# Patient Record
Sex: MALE | Race: WHITE | NOT HISPANIC OR LATINO | Employment: OTHER | URBAN - METROPOLITAN AREA
[De-identification: names, ages, dates, MRNs, and addresses within clinical notes are randomized per-mention and may not be internally consistent; named-entity substitution may affect disease eponyms.]

---

## 2018-06-09 ENCOUNTER — HOSPITAL ENCOUNTER (OUTPATIENT)
Facility: MEDICAL CENTER | Age: 73
End: 2018-06-11
Attending: EMERGENCY MEDICINE | Admitting: HOSPITALIST
Payer: COMMERCIAL

## 2018-06-09 ENCOUNTER — APPOINTMENT (OUTPATIENT)
Dept: RADIOLOGY | Facility: MEDICAL CENTER | Age: 73
End: 2018-06-09
Attending: EMERGENCY MEDICINE
Payer: COMMERCIAL

## 2018-06-09 DIAGNOSIS — T14.8XXA ABRASION: ICD-10-CM

## 2018-06-09 DIAGNOSIS — S52.531A CLOSED COLLES' FRACTURE OF RIGHT RADIUS, INITIAL ENCOUNTER: ICD-10-CM

## 2018-06-09 DIAGNOSIS — S52.611A CLOSED DISPLACED FRACTURE OF STYLOID PROCESS OF RIGHT ULNA, INITIAL ENCOUNTER: ICD-10-CM
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
## 2018-06-09 DIAGNOSIS — S52.131A DISPLACED FRACTURE OF NECK OF RIGHT RADIUS, INITIAL ENCOUNTER FOR CLOSED FRACTURE: ICD-10-CM

## 2018-06-09 LAB
ANION GAP SERPL CALC-SCNC: 10 MMOL/L (ref 0–11.9)
APTT PPP: 24.7 SEC (ref 24.7–36)
BASOPHILS # BLD AUTO: 0.8 % (ref 0–1.8)
BASOPHILS # BLD: 0.1 K/UL (ref 0–0.12)
BUN SERPL-MCNC: 13 MG/DL (ref 8–22)
CALCIUM SERPL-MCNC: 9 MG/DL (ref 8.4–10.2)
CHLORIDE SERPL-SCNC: 105 MMOL/L (ref 96–112)
CO2 SERPL-SCNC: 22 MMOL/L (ref 20–33)
CREAT SERPL-MCNC: 0.94 MG/DL (ref 0.5–1.4)
EOSINOPHIL # BLD AUTO: 0.44 K/UL (ref 0–0.51)
EOSINOPHIL NFR BLD: 3.5 % (ref 0–6.9)
ERYTHROCYTE [DISTWIDTH] IN BLOOD BY AUTOMATED COUNT: 43.4 FL (ref 35.9–50)
GLUCOSE SERPL-MCNC: 107 MG/DL (ref 65–99)
HCT VFR BLD AUTO: 45.3 % (ref 42–52)
HGB BLD-MCNC: 15.4 G/DL (ref 14–18)
IMM GRANULOCYTES # BLD AUTO: 0.05 K/UL (ref 0–0.11)
IMM GRANULOCYTES NFR BLD AUTO: 0.4 % (ref 0–0.9)
INR PPP: 0.98 (ref 0.87–1.13)
LYMPHOCYTES # BLD AUTO: 1.42 K/UL (ref 1–4.8)
LYMPHOCYTES NFR BLD: 11.4 % (ref 22–41)
MCH RBC QN AUTO: 31.1 PG (ref 27–33)
MCHC RBC AUTO-ENTMCNC: 34 G/DL (ref 33.7–35.3)
MCV RBC AUTO: 91.5 FL (ref 81.4–97.8)
MONOCYTES # BLD AUTO: 1.02 K/UL (ref 0–0.85)
MONOCYTES NFR BLD AUTO: 8.2 % (ref 0–13.4)
NEUTROPHILS # BLD AUTO: 9.41 K/UL (ref 1.82–7.42)
NEUTROPHILS NFR BLD: 75.7 % (ref 44–72)
NRBC # BLD AUTO: 0 K/UL
NRBC BLD-RTO: 0 /100 WBC
PLATELET # BLD AUTO: 226 K/UL (ref 164–446)
PMV BLD AUTO: 10.4 FL (ref 9–12.9)
POTASSIUM SERPL-SCNC: 3.9 MMOL/L (ref 3.6–5.5)
PROTHROMBIN TIME: 12.9 SEC (ref 12–14.6)
RBC # BLD AUTO: 4.95 M/UL (ref 4.7–6.1)
SODIUM SERPL-SCNC: 137 MMOL/L (ref 135–145)
WBC # BLD AUTO: 12.4 K/UL (ref 4.8–10.8)

## 2018-06-09 PROCEDURE — G0378 HOSPITAL OBSERVATION PER HR: HCPCS

## 2018-06-09 PROCEDURE — 302874 HCHG BANDAGE ACE 2 OR 3""

## 2018-06-09 PROCEDURE — A9270 NON-COVERED ITEM OR SERVICE: HCPCS | Performed by: HOSPITALIST

## 2018-06-09 PROCEDURE — 96374 THER/PROPH/DIAG INJ IV PUSH: CPT

## 2018-06-09 PROCEDURE — 90471 IMMUNIZATION ADMIN: CPT

## 2018-06-09 PROCEDURE — 700102 HCHG RX REV CODE 250 W/ 637 OVERRIDE(OP): Performed by: HOSPITALIST

## 2018-06-09 PROCEDURE — 80048 BASIC METABOLIC PNL TOTAL CA: CPT

## 2018-06-09 PROCEDURE — 700101 HCHG RX REV CODE 250: Performed by: EMERGENCY MEDICINE

## 2018-06-09 PROCEDURE — 85730 THROMBOPLASTIN TIME PARTIAL: CPT

## 2018-06-09 PROCEDURE — 99285 EMERGENCY DEPT VISIT HI MDM: CPT

## 2018-06-09 PROCEDURE — 99219 PR INITIAL OBSERVATION CARE,LEVL II: CPT | Performed by: HOSPITALIST

## 2018-06-09 PROCEDURE — 85610 PROTHROMBIN TIME: CPT

## 2018-06-09 PROCEDURE — 71045 X-RAY EXAM CHEST 1 VIEW: CPT

## 2018-06-09 PROCEDURE — 90715 TDAP VACCINE 7 YRS/> IM: CPT | Performed by: EMERGENCY MEDICINE

## 2018-06-09 PROCEDURE — 700111 HCHG RX REV CODE 636 W/ 250 OVERRIDE (IP): Performed by: HOSPITALIST

## 2018-06-09 PROCEDURE — 700111 HCHG RX REV CODE 636 W/ 250 OVERRIDE (IP): Performed by: EMERGENCY MEDICINE

## 2018-06-09 PROCEDURE — 96375 TX/PRO/DX INJ NEW DRUG ADDON: CPT

## 2018-06-09 PROCEDURE — 25605 CLTX DST RDL FX/EPHYS SEP W/: CPT

## 2018-06-09 PROCEDURE — 85025 COMPLETE CBC W/AUTO DIFF WBC: CPT

## 2018-06-09 PROCEDURE — 73200 CT UPPER EXTREMITY W/O DYE: CPT | Mod: RT

## 2018-06-09 PROCEDURE — 700105 HCHG RX REV CODE 258: Performed by: HOSPITALIST

## 2018-06-09 PROCEDURE — 36415 COLL VENOUS BLD VENIPUNCTURE: CPT

## 2018-06-09 PROCEDURE — 73110 X-RAY EXAM OF WRIST: CPT | Mod: RT

## 2018-06-09 RX ORDER — POLYETHYLENE GLYCOL 3350 17 G/17G
1 POWDER, FOR SOLUTION ORAL
Status: DISCONTINUED | OUTPATIENT
Start: 2018-06-09 | End: 2018-06-11 | Stop reason: HOSPADM

## 2018-06-09 RX ORDER — BUPIVACAINE HYDROCHLORIDE 5 MG/ML
10 INJECTION, SOLUTION EPIDURAL; INTRACAUDAL ONCE
Status: COMPLETED | OUTPATIENT
Start: 2018-06-09 | End: 2018-06-09

## 2018-06-09 RX ORDER — AMOXICILLIN 250 MG
2 CAPSULE ORAL 2 TIMES DAILY
Status: DISCONTINUED | OUTPATIENT
Start: 2018-06-09 | End: 2018-06-11 | Stop reason: HOSPADM

## 2018-06-09 RX ORDER — ONDANSETRON 2 MG/ML
4 INJECTION INTRAMUSCULAR; INTRAVENOUS EVERY 4 HOURS PRN
Status: DISCONTINUED | OUTPATIENT
Start: 2018-06-09 | End: 2018-06-11 | Stop reason: HOSPADM

## 2018-06-09 RX ORDER — SODIUM CHLORIDE 9 MG/ML
INJECTION, SOLUTION INTRAVENOUS CONTINUOUS
Status: DISCONTINUED | OUTPATIENT
Start: 2018-06-09 | End: 2018-06-10

## 2018-06-09 RX ORDER — BISACODYL 10 MG
10 SUPPOSITORY, RECTAL RECTAL
Status: DISCONTINUED | OUTPATIENT
Start: 2018-06-09 | End: 2018-06-11 | Stop reason: HOSPADM

## 2018-06-09 RX ORDER — MORPHINE SULFATE 4 MG/ML
2 INJECTION, SOLUTION INTRAMUSCULAR; INTRAVENOUS EVERY 4 HOURS PRN
Status: DISCONTINUED | OUTPATIENT
Start: 2018-06-09 | End: 2018-06-11 | Stop reason: HOSPADM

## 2018-06-09 RX ORDER — ONDANSETRON 4 MG/1
4 TABLET, ORALLY DISINTEGRATING ORAL EVERY 4 HOURS PRN
Status: DISCONTINUED | OUTPATIENT
Start: 2018-06-09 | End: 2018-06-11 | Stop reason: HOSPADM

## 2018-06-09 RX ADMIN — CLOSTRIDIUM TETANI TOXOID ANTIGEN (FORMALDEHYDE INACTIVATED), CORYNEBACTERIUM DIPHTHERIAE TOXOID ANTIGEN (FORMALDEHYDE INACTIVATED), BORDETELLA PERTUSSIS TOXOID ANTIGEN (GLUTARALDEHYDE INACTIVATED), BORDETELLA PERTUSSIS FILAMENTOUS HEMAGGLUTININ ANTIGEN (FORMALDEHYDE INACTIVATED), BORDETELLA PERTUSSIS PERTACTIN ANTIGEN, AND BORDETELLA PERTUSSIS FIMBRIAE 2/3 ANTIGEN 0.5 ML: 5; 2; 2.5; 5; 3; 5 INJECTION, SUSPENSION INTRAMUSCULAR at 18:42

## 2018-06-09 RX ADMIN — BUPIVACAINE HYDROCHLORIDE 10 ML: 5 INJECTION, SOLUTION EPIDURAL; INTRACAUDAL; PERINEURAL at 18:30

## 2018-06-09 RX ADMIN — MORPHINE SULFATE 2 MG: 4 INJECTION INTRAVENOUS at 22:31

## 2018-06-09 RX ADMIN — HYDROMORPHONE HYDROCHLORIDE 0.5 MG: 1 INJECTION, SOLUTION INTRAMUSCULAR; INTRAVENOUS; SUBCUTANEOUS at 20:30

## 2018-06-09 RX ADMIN — SODIUM CHLORIDE 1000 ML: 9 INJECTION, SOLUTION INTRAVENOUS at 20:27

## 2018-06-09 RX ADMIN — DOCUSATE SODIUM AND SENNOSIDES 2 TABLET: 8.6; 5 TABLET, FILM COATED ORAL at 22:30

## 2018-06-09 ASSESSMENT — ENCOUNTER SYMPTOMS
NEUROLOGICAL NEGATIVE: 1
NAUSEA: 0
ABDOMINAL PAIN: 0
PALPITATIONS: 0
HEMOPTYSIS: 0
COUGH: 0
HALLUCINATIONS: 0
HEARTBURN: 0
DIZZINESS: 0
DEPRESSION: 0
MYALGIAS: 1
SPUTUM PRODUCTION: 0
CHILLS: 0
FEVER: 0
HEADACHES: 0
TINGLING: 0

## 2018-06-09 ASSESSMENT — PAIN SCALES - GENERAL
PAINLEVEL_OUTOF10: 4
PAINLEVEL_OUTOF10: 5
PAINLEVEL_OUTOF10: 3
PAINLEVEL_OUTOF10: 4

## 2018-06-09 ASSESSMENT — LIFESTYLE VARIABLES
SUBSTANCE_ABUSE: 0
EVER_SMOKED: YES

## 2018-06-09 NOTE — ED NOTES
"Pt report falling in his \"steep driveway.\"  He C/O right wrist pain/swelling, with multiple small facial abrasions with left knee and left elbow pain as well.  He refuses any additional X-rays.   "

## 2018-06-10 ENCOUNTER — APPOINTMENT (OUTPATIENT)
Dept: RADIOLOGY | Facility: MEDICAL CENTER | Age: 73
End: 2018-06-10
Attending: SURGERY
Payer: COMMERCIAL

## 2018-06-10 PROBLEM — D72.829 LEUKOCYTOSIS: Status: ACTIVE | Noted: 2018-06-10

## 2018-06-10 LAB
ANION GAP SERPL CALC-SCNC: 9 MMOL/L (ref 0–11.9)
BASOPHILS # BLD AUTO: 0.6 % (ref 0–1.8)
BASOPHILS # BLD: 0.06 K/UL (ref 0–0.12)
BUN SERPL-MCNC: 11 MG/DL (ref 8–22)
CALCIUM SERPL-MCNC: 9 MG/DL (ref 8.4–10.2)
CHLORIDE SERPL-SCNC: 107 MMOL/L (ref 96–112)
CO2 SERPL-SCNC: 23 MMOL/L (ref 20–33)
CREAT SERPL-MCNC: 0.86 MG/DL (ref 0.5–1.4)
EOSINOPHIL # BLD AUTO: 0.26 K/UL (ref 0–0.51)
EOSINOPHIL NFR BLD: 2.6 % (ref 0–6.9)
ERYTHROCYTE [DISTWIDTH] IN BLOOD BY AUTOMATED COUNT: 43.5 FL (ref 35.9–50)
GLUCOSE SERPL-MCNC: 104 MG/DL (ref 65–99)
HCT VFR BLD AUTO: 42.7 % (ref 42–52)
HGB BLD-MCNC: 14.3 G/DL (ref 14–18)
IMM GRANULOCYTES # BLD AUTO: 0.02 K/UL (ref 0–0.11)
IMM GRANULOCYTES NFR BLD AUTO: 0.2 % (ref 0–0.9)
LYMPHOCYTES # BLD AUTO: 1.87 K/UL (ref 1–4.8)
LYMPHOCYTES NFR BLD: 19 % (ref 22–41)
MCH RBC QN AUTO: 30.5 PG (ref 27–33)
MCHC RBC AUTO-ENTMCNC: 33.5 G/DL (ref 33.7–35.3)
MCV RBC AUTO: 91 FL (ref 81.4–97.8)
MONOCYTES # BLD AUTO: 1.12 K/UL (ref 0–0.85)
MONOCYTES NFR BLD AUTO: 11.4 % (ref 0–13.4)
NEUTROPHILS # BLD AUTO: 6.52 K/UL (ref 1.82–7.42)
NEUTROPHILS NFR BLD: 66.2 % (ref 44–72)
NRBC # BLD AUTO: 0 K/UL
NRBC BLD-RTO: 0 /100 WBC
PLATELET # BLD AUTO: 215 K/UL (ref 164–446)
PMV BLD AUTO: 11.1 FL (ref 9–12.9)
POTASSIUM SERPL-SCNC: 3.9 MMOL/L (ref 3.6–5.5)
RBC # BLD AUTO: 4.69 M/UL (ref 4.7–6.1)
SODIUM SERPL-SCNC: 139 MMOL/L (ref 135–145)
WBC # BLD AUTO: 9.9 K/UL (ref 4.8–10.8)

## 2018-06-10 PROCEDURE — 700102 HCHG RX REV CODE 250 W/ 637 OVERRIDE(OP): Performed by: INTERNAL MEDICINE

## 2018-06-10 PROCEDURE — 700111 HCHG RX REV CODE 636 W/ 250 OVERRIDE (IP)

## 2018-06-10 PROCEDURE — C1713 ANCHOR/SCREW BN/BN,TIS/BN: HCPCS | Performed by: SURGERY

## 2018-06-10 PROCEDURE — 160048 HCHG OR STATISTICAL LEVEL 1-5: Performed by: SURGERY

## 2018-06-10 PROCEDURE — 500881 HCHG PACK, EXTREMITY: Performed by: SURGERY

## 2018-06-10 PROCEDURE — 700102 HCHG RX REV CODE 250 W/ 637 OVERRIDE(OP)

## 2018-06-10 PROCEDURE — 501445 HCHG STAPLER, SKIN DISP: Performed by: SURGERY

## 2018-06-10 PROCEDURE — 160028 HCHG SURGERY MINUTES - 1ST 30 MINS LEVEL 3: Performed by: SURGERY

## 2018-06-10 PROCEDURE — 700111 HCHG RX REV CODE 636 W/ 250 OVERRIDE (IP): Performed by: HOSPITALIST

## 2018-06-10 PROCEDURE — 502000 HCHG MISC OR IMPLANTS RC 0278: Performed by: SURGERY

## 2018-06-10 PROCEDURE — 36415 COLL VENOUS BLD VENIPUNCTURE: CPT

## 2018-06-10 PROCEDURE — 501838 HCHG SUTURE GENERAL: Performed by: SURGERY

## 2018-06-10 PROCEDURE — A6222 GAUZE <=16 IN NO W/SAL W/O B: HCPCS | Performed by: SURGERY

## 2018-06-10 PROCEDURE — 160035 HCHG PACU - 1ST 60 MINS PHASE I: Performed by: SURGERY

## 2018-06-10 PROCEDURE — 96376 TX/PRO/DX INJ SAME DRUG ADON: CPT

## 2018-06-10 PROCEDURE — 99225 PR SUBSEQUENT OBSERVATION CARE,LEVEL II: CPT | Performed by: INTERNAL MEDICINE

## 2018-06-10 PROCEDURE — A9270 NON-COVERED ITEM OR SERVICE: HCPCS | Performed by: INTERNAL MEDICINE

## 2018-06-10 PROCEDURE — G0378 HOSPITAL OBSERVATION PER HR: HCPCS

## 2018-06-10 PROCEDURE — 80048 BASIC METABOLIC PNL TOTAL CA: CPT

## 2018-06-10 PROCEDURE — 700101 HCHG RX REV CODE 250

## 2018-06-10 PROCEDURE — 700105 HCHG RX REV CODE 258: Performed by: HOSPITALIST

## 2018-06-10 PROCEDURE — 160002 HCHG RECOVERY MINUTES (STAT): Performed by: SURGERY

## 2018-06-10 PROCEDURE — A9270 NON-COVERED ITEM OR SERVICE: HCPCS

## 2018-06-10 PROCEDURE — A6454 SELF-ADHER BAND W>=3" <5"/YD: HCPCS | Performed by: SURGERY

## 2018-06-10 PROCEDURE — 160022 HCHG BLOCK: Performed by: SURGERY

## 2018-06-10 PROCEDURE — 160039 HCHG SURGERY MINUTES - EA ADDL 1 MIN LEVEL 3: Performed by: SURGERY

## 2018-06-10 PROCEDURE — 160009 HCHG ANES TIME/MIN: Performed by: SURGERY

## 2018-06-10 PROCEDURE — 73100 X-RAY EXAM OF WRIST: CPT | Mod: RT

## 2018-06-10 PROCEDURE — 85025 COMPLETE CBC W/AUTO DIFF WBC: CPT

## 2018-06-10 DEVICE — IMPLANTABLE DEVICE: Type: IMPLANTABLE DEVICE | Site: WRIST | Status: FUNCTIONAL

## 2018-06-10 RX ORDER — HYDROMORPHONE HYDROCHLORIDE 2 MG/ML
0.5 INJECTION, SOLUTION INTRAMUSCULAR; INTRAVENOUS; SUBCUTANEOUS EVERY 4 HOURS PRN
Status: DISCONTINUED | OUTPATIENT
Start: 2018-06-10 | End: 2018-06-11 | Stop reason: HOSPADM

## 2018-06-10 RX ORDER — OXYCODONE HYDROCHLORIDE AND ACETAMINOPHEN 5; 325 MG/1; MG/1
TABLET ORAL
Status: COMPLETED
Start: 2018-06-10 | End: 2018-06-10

## 2018-06-10 RX ORDER — OXYCODONE HYDROCHLORIDE 5 MG/1
5 TABLET ORAL EVERY 4 HOURS PRN
Qty: 15 TAB | Refills: 0 | Status: SHIPPED | OUTPATIENT
Start: 2018-06-10 | End: 2018-06-17

## 2018-06-10 RX ORDER — OXYCODONE HYDROCHLORIDE 5 MG/1
5 TABLET ORAL EVERY 4 HOURS PRN
Status: DISCONTINUED | OUTPATIENT
Start: 2018-06-10 | End: 2018-06-11 | Stop reason: HOSPADM

## 2018-06-10 RX ORDER — OXYCODONE HYDROCHLORIDE 10 MG/1
10 TABLET ORAL EVERY 4 HOURS PRN
Status: DISCONTINUED | OUTPATIENT
Start: 2018-06-10 | End: 2018-06-10

## 2018-06-10 RX ORDER — BACITRACIN 50000 [IU]/1
INJECTION, POWDER, FOR SOLUTION INTRAMUSCULAR
Status: DISCONTINUED | OUTPATIENT
Start: 2018-06-10 | End: 2018-06-10 | Stop reason: HOSPADM

## 2018-06-10 RX ADMIN — OXYCODONE HYDROCHLORIDE 5 MG: 5 TABLET ORAL at 19:02

## 2018-06-10 RX ADMIN — MORPHINE SULFATE 2 MG: 4 INJECTION INTRAVENOUS at 02:36

## 2018-06-10 RX ADMIN — FENTANYL CITRATE 25 MCG: 50 INJECTION, SOLUTION INTRAMUSCULAR; INTRAVENOUS at 11:37

## 2018-06-10 RX ADMIN — MORPHINE SULFATE 2 MG: 4 INJECTION INTRAVENOUS at 07:29

## 2018-06-10 RX ADMIN — HYDROMORPHONE HYDROCHLORIDE 0.5 MG: 2 INJECTION, SOLUTION INTRAMUSCULAR; INTRAVENOUS; SUBCUTANEOUS at 09:35

## 2018-06-10 RX ADMIN — FENTANYL CITRATE 25 MCG: 50 INJECTION, SOLUTION INTRAMUSCULAR; INTRAVENOUS at 11:45

## 2018-06-10 RX ADMIN — HYDROMORPHONE HYDROCHLORIDE 0.5 MG: 2 INJECTION, SOLUTION INTRAMUSCULAR; INTRAVENOUS; SUBCUTANEOUS at 05:15

## 2018-06-10 RX ADMIN — OXYCODONE HYDROCHLORIDE AND ACETAMINOPHEN 1 TABLET: 5; 325 TABLET ORAL at 11:40

## 2018-06-10 RX ADMIN — SODIUM CHLORIDE: 9 INJECTION, SOLUTION INTRAVENOUS at 05:11

## 2018-06-10 ASSESSMENT — PAIN SCALES - GENERAL
PAINLEVEL_OUTOF10: 6
PAINLEVEL_OUTOF10: 10
PAINLEVEL_OUTOF10: 10
PAINLEVEL_OUTOF10: 3
PAINLEVEL_OUTOF10: 10
PAINLEVEL_OUTOF10: 5
PAINLEVEL_OUTOF10: 2
PAINLEVEL_OUTOF10: 10
PAINLEVEL_OUTOF10: 9
PAINLEVEL_OUTOF10: 10

## 2018-06-10 ASSESSMENT — ENCOUNTER SYMPTOMS
VOMITING: 0
HALLUCINATIONS: 0
DIZZINESS: 0
PALPITATIONS: 0
DEPRESSION: 0
DIARRHEA: 0
COUGH: 0
SORE THROAT: 0
SHORTNESS OF BREATH: 1
FEVER: 0
NAUSEA: 0
BACK PAIN: 0
CHILLS: 0
MYALGIAS: 1
BLOOD IN STOOL: 0
HEADACHES: 0
FOCAL WEAKNESS: 0
ABDOMINAL PAIN: 0
WEAKNESS: 0
HEARTBURN: 0

## 2018-06-10 ASSESSMENT — LIFESTYLE VARIABLES: ALCOHOL_USE: NO

## 2018-06-10 ASSESSMENT — PATIENT HEALTH QUESTIONNAIRE - PHQ9
1. LITTLE INTEREST OR PLEASURE IN DOING THINGS: SEVERAL DAYS
2. FEELING DOWN, DEPRESSED, IRRITABLE, OR HOPELESS: SEVERAL DAYS
8. MOVING OR SPEAKING SO SLOWLY THAT OTHER PEOPLE COULD HAVE NOTICED. OR THE OPPOSITE, BEING SO FIGETY OR RESTLESS THAT YOU HAVE BEEN MOVING AROUND A LOT MORE THAN USUAL: NOT AT ALL
7. TROUBLE CONCENTRATING ON THINGS, SUCH AS READING THE NEWSPAPER OR WATCHING TELEVISION: NOT AT ALL
SUM OF ALL RESPONSES TO PHQ9 QUESTIONS 1 AND 2: 2
4. FEELING TIRED OR HAVING LITTLE ENERGY: NOT AT ALL
9. THOUGHTS THAT YOU WOULD BE BETTER OFF DEAD, OR OF HURTING YOURSELF: NOT AT ALL
6. FEELING BAD ABOUT YOURSELF - OR THAT YOU ARE A FAILURE OR HAVE LET YOURSELF OR YOUR FAMILY DOWN: NOT AL ALL
5. POOR APPETITE OR OVEREATING: NOT AT ALL

## 2018-06-10 NOTE — ED NOTES
Iv placed , labs drawn and taken to lab. Pt medicated as directed by WINIFRED urban, poc update given to pt. Further orders and dispo pending at this time. No further questions from pt at this time

## 2018-06-10 NOTE — PROGRESS NOTES
Bedside rounds with Dr. Weems. Dr. Weems made aware  OT is not available today. Okay for  pt to go home today, pending discharge orders.

## 2018-06-10 NOTE — PROGRESS NOTES
Pt arrived on [facility], no signs of distress. Pt asleep but wakes to verbal stimulation. CMS intact. POC discussed. Pt complains of 5/10 pain, RUE elevated and iced.

## 2018-06-10 NOTE — ED NOTES
Patient to CT via gurney. Patient to go to inpatient room on return from CT. Belongings bagged for patient.

## 2018-06-10 NOTE — H&P
Hospital Medicine History and Physical    Date of Service  2018    Chief Complaint  Chief Complaint   Patient presents with   • Wrist Pain       History of Presenting Illness  73 y.o. male who presented 2018 with right arm pain. He tripped and fell down a steep driveway. He fell and injured his right arm. Right arm pain, intensity 8/10, constant, no radiation, improved by the narcotics given in ER. Xray shows fractured  Radius that requires surgical repair. Orthopedics has been consulted        Surgery planned tomorrow  Primary Care Physician  Pcp Pt States None    Consultants  Dr casarez    Code Status  full    Review of Systems  Review of Systems   Constitutional: Negative for chills and fever.   HENT: Negative for ear discharge, ear pain, hearing loss and tinnitus.    Respiratory: Negative for cough, hemoptysis and sputum production.    Cardiovascular: Negative for chest pain and palpitations.   Gastrointestinal: Negative for abdominal pain, heartburn and nausea.   Genitourinary: Negative for dysuria and frequency.   Musculoskeletal: Positive for joint pain (right arm) and myalgias.   Neurological: Negative.  Negative for dizziness, tingling and headaches.   Psychiatric/Behavioral: Negative for depression, hallucinations, substance abuse and suicidal ideas.        Past Medical History  none    Surgical History  Right foot surgery    Medications  none    Family History  No family hx of stroke    No family hx of abnormal bleeding    Social History  Social History   Substance Use Topics   • Smoking status: Former Smoker   • Smokeless tobacco: Never Used   • Alcohol use Yes      Comment: Occasionally       Allergies  No Known Allergies     Physical Exam  Laboratory   Hemodynamics  Temp (24hrs), Av.2 °C (99 °F), Min:37.2 °C (99 °F), Max:37.2 °C (99 °F)   Temperature: 37.2 °C (99 °F)  Pulse  Av  Min: 83  Max: 83    Blood Pressure : 152/93      Respiratory      Respiration: 18, Pulse Oximetry: 95  %             Physical Exam   Constitutional: He is oriented to person, place, and time. He appears distressed.   HENT:   Head: Atraumatic.   Abrasion on bridge of nose   Eyes: Right eye exhibits no discharge. Left eye exhibits no discharge. No scleral icterus.   Neck: No JVD present. No tracheal deviation present. No thyromegaly present.   Cardiovascular: Normal rate.  Exam reveals no gallop and no friction rub.    No murmur heard.  Pulmonary/Chest: No respiratory distress. He has no wheezes. He has no rales. He exhibits no tenderness.   Abdominal: He exhibits no distension. There is no tenderness.   Musculoskeletal:   Right arm swelling and tenderness    A brasion left knee   Neurological: He is alert and oriented to person, place, and time. No cranial nerve deficit. Coordination normal.   Skin: No rash noted. He is not diaphoretic. No erythema. No pallor.       Recent Labs      18   1835   WBC  12.4*   RBC  4.95   HEMOGLOBIN  15.4   HEMATOCRIT  45.3   MCV  91.5   MCH  31.1   MCHC  34.0   RDW  43.4   PLATELETCT  226   MPV  10.4     Recent Labs      18   1835   SODIUM  137   POTASSIUM  3.9   CHLORIDE  105   CO2  22   GLUCOSE  107*   BUN  13   CREATININE  0.94   CALCIUM  9.0     Recent Labs      18   1835   GLUCOSE  107*     Recent Labs      18   1835   APTT  24.7   INR  0.98             No results found for: TROPONINI  Urinalysis:  No results found for: SPECGRAVITY, GLUCOSEUR, KETONES, NITRITE, WBCURINE, RBCURINE, BACTERIA, EPITHELCELL     Imaging  Patient Name  Maynor Pringle ([id number]) Sex  Male   1945   Room Bed Code Status Current Location   -ROOM 9 09 FULL 09   Reprint Order Requisition     DX-WRIST-COMPLETE 3+ RIGHT (Order #[id number]) on 18   Last Resulted Time   Sat 2018  5:47 PM   Images     Show images for DX-WRIST-COMPLETE 3+ RIGHT   Imaging Result Status     Status: Final result (Exam End: 2018  5:38 PM)   Imaging Previous Results     Open Hard Copy Result  Report (Order #[id number] - DX-WRIST-COMPLETE 3+ RIGHT)   Narrative       6/9/2018 5:28 PM    HISTORY/REASON FOR EXAM:  Pain/Deformity Following Trauma  Right wrist pain and swelling after fall on steep driveway today    TECHNIQUE/EXAM DESCRIPTION AND NUMBER OF VIEWS:  3 views of the RIGHT wrist.    COMPARISON: None    FINDINGS:    Diffuse osseous demineralization.    Acute comminuted and impacted fracture of the distal radius with volar displacement of the distal fracture fragment and the hand en bloc.    Acute displaced ulnar styloid fracture.    Diffuse soft tissue swelling about the distal forearm.   Impression         Acute comminuted, displaced and impacted fracture of the distal radius.    Acute displaced ulnar styloid fracture.        Assessment/Plan     I anticipate this patient is appropriate for observation status at this time.    * Displaced fracture of neck of right radius, initial encounter for closed fracture- (present on admission)   Assessment & Plan    Npo after midnight    Iv morphine for pain control    Check ekg    Hydrate    Ortho on case            VTE prophylaxis: scd

## 2018-06-10 NOTE — H&P
1121: Pt arrived post ORIF R distal radius, Respirations unlabored/sats approp on NC, Anesthesia report and OR RN hand off, RUE in sling/dressing intact-+cap refill to R fingers/slightly swollen/able to move fingers, Pt did have R supraclavicular nerve block pre operatively, IVF infusing  1130: Pt states pain 6/10-see MAR, Denies nausea/sips of water, Sat approp on NC, RUE elevated on pillows/ice pack in place  1150: Pain starting to improve, No nausea, Weaning oxygen as tolerated  1205: Pain remains tolerable, VSS, Will call report to [facility] RN

## 2018-06-10 NOTE — PROGRESS NOTES
2 RN skin assessment done; skin not WDL.  Abrasion to left elbow, rt knee. Bruising to left thumb and Scabs to nose and left side of face.

## 2018-06-10 NOTE — ED NOTES
Assisted with patient care. Patient tolerated traction with finger splints and 15 lbs traction well. Right wrist splinted by ERP and ER Tech.  Patient brought dinner.

## 2018-06-10 NOTE — PROGRESS NOTES
Pt c/o pain 10/10. Pt states 2mg morphine for pain only relieves his pain for 30mins.  Notified Dr. Baugh if she can give him another pain med to rotate between the morphine.  Dr. Baugh said she will look into it and will place the order.

## 2018-06-10 NOTE — OR SURGEON
Immediate Post OP Note    PreOp Diagnosis: right distal radius fracture    PostOp Diagnosis: same    Procedure(s):  WRIST ORIF - Wound Class: Clean    Surgeon(s):  Ryan Pryor M.D.    Anesthesiologist/Type of Anesthesia:  Anesthesiologist: Rachid Walker M.D./General    Surgical Staff:  Circulator: Liss Harris R.N.  Scrub Person: Jalen Gordillo    Specimens removed if any:  * No specimens in log *    Estimated Blood Loss: minimal    Findings: see dictation    Complications: none noted.     Plan: NWB RUE x 6 weeks; strict elevation; sling for comfort splint to remain on, clean, and dry until clinic followup. Okay to d/c home when pain controlled and cleared by hospitalist; day of surgery okay too. f/u [facility] 10-14 days.         6/10/2018 11:20 AM Ryan Pryor M.D.

## 2018-06-10 NOTE — DISCHARGE SUMMARY
Discharge Summary    CHIEF COMPLAINT ON ADMISSION  Chief Complaint   Patient presents with   • Wrist Pain       Reason for Admission  rt arm/wrist injury     Admission Date  6/9/2018    CODE STATUS  Full Code    HPI & HOSPITAL COURSE  This is a 73 y.o. male w hx of obesity here after a ground-level fall, complete right wrist pain. X-ray of the wrist showed acute comminuted displaced and impacted fracture of the distal radius. There was also an acute displaced ulnar styloid fracture. Orthopedic surgery was consulted and the patient was admitted with plan for surgical repair. His pain was controlled however only modestly with IV pain medications.  He underwent an ORIF of the right wrist is without any consultations. Postoperatively his pain had significantly improved. He had postop instructions to be nonweightbearing on the right upper extremity for 6 weeks and to continue strict elevation. He can wear a sling for comfort and the splint is to remain on clean and dry until follow-up appointment. He used IS post-op and had good activity tolerance after one night of observation.  He will be discharged home with oral pain medications as he was maintaining good saturations and inability independently on room air. He will follow-up with [facility] in 10-14 days.       Therefore, he is discharged in good and stable condition to home with close outpatient follow-up.    The patient recovered much more quickly than anticipated on admission.    Discharge Date  6/11/2018    FOLLOW UP ITEMS POST DISCHARGE  NWB RUE x 6 weeks; strict elevation; sling for comfort   plint to remain on, clean, and dry until clinic followup with [facility] in 10-14 days.      DISCHARGE DIAGNOSES  Principal Problem:    Displaced fracture of neck of right radius, initial encounter for closed fracture POA: Yes  Active Problems:    Leukocytosis POA: Unknown  Resolved Problems:    * No resolved hospital problems. *      FOLLOW UP  Ryan Pryor,  M.D.  [address] [place] [address]  [phone]    Schedule an appointment as soon as possible for a visit in 1 week  Follow up appointment      MEDICATIONS ON DISCHARGE     Medication List      START taking these medications      Instructions   oxyCODONE immediate-release 5 MG Tabs  Commonly known as:  ROXICODONE   Take 1 Tab by mouth every four hours as needed for up to 7 days.  Dose:  5 mg            Allergies  No Known Allergies    DIET  Orders Placed This Encounter   Procedures   • DIET ORDER     Standing Status:   Standing     Number of Occurrences:   1     Order Specific Question:   Diet:     Answer:   Regular [1]       ACTIVITY  As tolerated.  Non-weight bearing RIGHT upper extremity for 6 weeks    CONSULTATIONS  Dr. Pryor - Ortho    PROCEDURES  6/10/2018: ORIF right wrist. No complications    LABORATORY  Lab Results   Component Value Date    SODIUM 139 06/10/2018    POTASSIUM 3.9 06/10/2018    CHLORIDE 107 06/10/2018    CO2 23 06/10/2018    GLUCOSE 104 (H) 06/10/2018    BUN 11 06/10/2018    CREATININE 0.86 06/10/2018        Lab Results   Component Value Date    WBC 9.9 06/10/2018    HEMOGLOBIN 14.3 06/10/2018    HEMATOCRIT 42.7 06/10/2018    PLATELETCT 215 06/10/2018        Total time of the discharge process exceeds 40 minutes.

## 2018-06-10 NOTE — ED PROVIDER NOTES
"CHIEF COMPLAINT  Chief Complaint   Patient presents with   • Wrist Pain       HPI  Maynor Pringle is a 73 y.o. male who presents for moderately severe right dominant wrist pain and dinner fork deformity. Patient was running down a driveway after his dog when he fell onto an outstretched hand. His abrasion to his nose his right knee and his left forearm. Denies loss of consciousness headache nausea vomiting confusion or blood thinner use. Nothing by mouth since 2 PM. No adverse effects anesthesia. No recent upper respiratory infection. No asthma or tobacco use. Patient cannot move the fingers of the right hand secondary to pain but has no dysesthesias or numbness in the hand.    REVIEW OF SYSTEMS  Pertinent positives include: Right wrist pain and deformity, abrasions, fall.  Pertinent negatives include: Neck pain, back pain, chest pain, abdominal pain, hip pain, other extremity injury.  10+ systems reviewed and negative.      PAST MEDICAL HISTORY  Denies      SOCIAL HISTORY  Social History   Substance Use Topics   • Smoking status: Former Smoker   • Smokeless tobacco: Never Used   • Alcohol use Yes      Comment: Occasionally     History   Drug Use No       CURRENT MEDICATIONS  None.    ALLERGIES  No Known Allergies    PHYSICAL EXAM  VITAL SIGNS: /93   Pulse 83   Temp 37.2 °C (99 °F)   Resp 18   Ht 1.676 m (5' 6\") Comment: Statd  Wt 94 kg (207 lb 3.7 oz)   SpO2 95%   BMI 33.45 kg/m²   Reviewed and elevated blood pressure  Constitutional: Well developed, Well nourished, utterly overweight, appears to be in pain.  HENT: Normocephalic, superficial nasal abrasions without deformity or tenderness of the nose or face. bilateral external ears normal, oropharynx moist, No exudates or erythema. No hemotympanum  Eyes: PERRLA, conjunctiva pink, no scleral icterus.   Cardiovascular: Regular S1 and S2 without murmur, rub, gallop. Radial pulse 2+ and capillary refill brisk in the 5 fingers of the right " hand  Respiratory: No rales, rhonchi, wheeze.  Gastrointestinal: Soft, nontender, nondistended.  Skin: Clean superficial abrasions right patella and left proximal ulnar forearm.   Genitourinary:  No costovertebral angle tenderness.   Neurologic: Alert & oriented x 3, cranial nerves 2-12 intact by passive exam.  No focal deficit noted. Finger flexion and extension preserved. Sensation intact to light touch on the pads of the 5 fingers and the 1st dorsal web space of the hand.  Psychiatric: Affect normal, Judgment normal, Mood normal.  Musculoskeletal: Dinner fork deformity right wrist. Severe edema distal quarter of right forearm. Tenderness over the radius and ulna. Mild swelling extends into the hand.    DIFFERENTIAL DIAGNOSIS:  Colles' fracture, intra-articular fracture, ulna fracture, scaphoid fracture, head injury abrasion.    EKG  Pending.    RADIOLOGY/PROCEDURES  CT-WRIST W/O PLUS RECONS RIGHT   Final Result      Radial and probable ulnar fractures as described above.      DX-CHEST-LIMITED (1 VIEW)   Final Result         No acute cardiopulmonary abnormalities are identified.      DX-WRIST-COMPLETE 3+ RIGHT   Final Result         Acute comminuted, displaced and impacted fracture of the distal radius.      Acute displaced ulnar styloid fracture.          LABORATORY:  Results for orders placed or performed during the hospital encounter of 06/09/18   CBC WITH DIFFERENTIAL   Result Value Ref Range    WBC 12.4 (H) 4.8 - 10.8 K/uL    RBC 4.95 4.70 - 6.10 M/uL    Hemoglobin 15.4 14.0 - 18.0 g/dL    Hematocrit 45.3 42.0 - 52.0 %    MCV 91.5 81.4 - 97.8 fL    MCH 31.1 27.0 - 33.0 pg    MCHC 34.0 33.7 - 35.3 g/dL    RDW 43.4 35.9 - 50.0 fL    Platelet Count 226 164 - 446 K/uL    MPV 10.4 9.0 - 12.9 fL    Neutrophils-Polys 75.70 (H) 44.00 - 72.00 %    Lymphocytes 11.40 (L) 22.00 - 41.00 %    Monocytes 8.20 0.00 - 13.40 %    Eosinophils 3.50 0.00 - 6.90 %    Basophils 0.80 0.00 - 1.80 %    Immature Granulocytes 0.40 0.00 -  0.90 %    Nucleated RBC 0.00 /100 WBC    Neutrophils (Absolute) 9.41 (H) 1.82 - 7.42 K/uL    Lymphs (Absolute) 1.42 1.00 - 4.80 K/uL    Monos (Absolute) 1.02 (H) 0.00 - 0.85 K/uL    Eos (Absolute) 0.44 0.00 - 0.51 K/uL    Baso (Absolute) 0.10 0.00 - 0.12 K/uL    Immature Granulocytes (abs) 0.05 0.00 - 0.11 K/uL    NRBC (Absolute) 0.00 K/uL   BASIC METABOLIC PANEL   Result Value Ref Range    Sodium 137 135 - 145 mmol/L    Potassium 3.9 3.6 - 5.5 mmol/L    Chloride 105 96 - 112 mmol/L    Co2 22 20 - 33 mmol/L    Glucose 107 (H) 65 - 99 mg/dL    Bun 13 8 - 22 mg/dL    Creatinine 0.94 0.50 - 1.40 mg/dL    Calcium 9.0 8.4 - 10.2 mg/dL    Anion Gap 10.0 0.0 - 11.9   PROTHROMBIN TIME   Result Value Ref Range    PT 12.9 12.0 - 14.6 sec    INR 0.98 0.87 - 1.13   APTT   Result Value Ref Range    APTT 24.7 24.7 - 36.0 sec       INTERVENTIONS:  Medications   bupivacaine (pf) (MARCAINE/SENSORCAINE) 0.5 % injection 10 mL (not administered)   HYDROmorphone (DILAUDID) injection 0.5 mg (not administered)   tetanus-dipth-acell pertussis (ADACEL) inj 0.5 mL (0.5 mL Intramuscular Given 6/9/18 1842)     Response: Improvement in pain.    COURSE & MEDICAL DECISION MAKING  Case discussed with Dr. Pryor orthopedics who requested admission to the hospitalist for open reduction and internal fixation tomorrow morning at 7 AM.    This discussed with Dr. Carballo hospitalist to admit the patient.    Procedure: Reduction. Patient anesthetized a standard sterile technique with 8 mL of 0.5% bupivacaine in a hematoma block. This resulted in moderate anesthesia. The patient was placed under 15 pounds of traction for 15 minutes. Fracture manipulated by me and splinted in a sugar tong by me. Patient neurovascularly intact after splint placement.    This patient presents with a comminuted displaced intra-articular distal radius fracture and an associated ulnar styloid fracture after a fall. He has a minor closed head injury without evidence of  concussion and skull fracture or intracranial hemorrhage. His other abrasions without evidence of significant torso or extremity injury.    PLAN:  As above    CONDITION: Fair.    FINAL IMPRESSION  1. Closed Colles' fracture of right radius, initial encounter    2. Closed displaced fracture of styloid process of right ulna, initial encounter    3. Fall, initial encounter    4. Abrasion    5.      Partial reduction supportive care of Colles' fracture      Electronically signed by: Mejia Peralta, 6/9/2018 6:26 PM

## 2018-06-10 NOTE — PROGRESS NOTES
Med rec updated and complete  Allergies reviewed  Pt reports no prescription medications, OTC's, or vitamins.  Pt reports no antibiotics in the last 30 days.

## 2018-06-10 NOTE — PROGRESS NOTES
Pt awake, alert and oriented. CMS intact on RUE, splint in place, fingers are warm, cap refill WNL. Pt medicated for 10/10 pain. Plan for surgery today. Pt remains NPO.

## 2018-06-11 VITALS
BODY MASS INDEX: 33.3 KG/M2 | WEIGHT: 207.23 LBS | HEIGHT: 66 IN | DIASTOLIC BLOOD PRESSURE: 75 MMHG | TEMPERATURE: 98.7 F | SYSTOLIC BLOOD PRESSURE: 150 MMHG | OXYGEN SATURATION: 93 % | RESPIRATION RATE: 18 BRPM | HEART RATE: 82 BPM

## 2018-06-11 LAB
ALBUMIN SERPL BCP-MCNC: 3.3 G/DL (ref 3.2–4.9)
BASOPHILS # BLD AUTO: 0.2 % (ref 0–1.8)
BASOPHILS # BLD: 0.03 K/UL (ref 0–0.12)
BUN SERPL-MCNC: 9 MG/DL (ref 8–22)
CALCIUM SERPL-MCNC: 8.9 MG/DL (ref 8.4–10.2)
CHLORIDE SERPL-SCNC: 107 MMOL/L (ref 96–112)
CO2 SERPL-SCNC: 25 MMOL/L (ref 20–33)
CREAT SERPL-MCNC: 0.82 MG/DL (ref 0.5–1.4)
EOSINOPHIL # BLD AUTO: 0.02 K/UL (ref 0–0.51)
EOSINOPHIL NFR BLD: 0.2 % (ref 0–6.9)
ERYTHROCYTE [DISTWIDTH] IN BLOOD BY AUTOMATED COUNT: 44.6 FL (ref 35.9–50)
GLUCOSE SERPL-MCNC: 130 MG/DL (ref 65–99)
HCT VFR BLD AUTO: 40.6 % (ref 42–52)
HGB BLD-MCNC: 13.6 G/DL (ref 14–18)
IMM GRANULOCYTES # BLD AUTO: 0.05 K/UL (ref 0–0.11)
IMM GRANULOCYTES NFR BLD AUTO: 0.4 % (ref 0–0.9)
LYMPHOCYTES # BLD AUTO: 1.77 K/UL (ref 1–4.8)
LYMPHOCYTES NFR BLD: 13.3 % (ref 22–41)
MCH RBC QN AUTO: 31.1 PG (ref 27–33)
MCHC RBC AUTO-ENTMCNC: 33.5 G/DL (ref 33.7–35.3)
MCV RBC AUTO: 92.7 FL (ref 81.4–97.8)
MONOCYTES # BLD AUTO: 1.73 K/UL (ref 0–0.85)
MONOCYTES NFR BLD AUTO: 13 % (ref 0–13.4)
NEUTROPHILS # BLD AUTO: 9.66 K/UL (ref 1.82–7.42)
NEUTROPHILS NFR BLD: 72.9 % (ref 44–72)
NRBC # BLD AUTO: 0 K/UL
NRBC BLD-RTO: 0 /100 WBC
PHOSPHATE SERPL-MCNC: 3.3 MG/DL (ref 2.5–4.5)
PLATELET # BLD AUTO: 207 K/UL (ref 164–446)
PMV BLD AUTO: 10.7 FL (ref 9–12.9)
POTASSIUM SERPL-SCNC: 4.2 MMOL/L (ref 3.6–5.5)
RBC # BLD AUTO: 4.38 M/UL (ref 4.7–6.1)
SODIUM SERPL-SCNC: 138 MMOL/L (ref 135–145)
WBC # BLD AUTO: 13.3 K/UL (ref 4.8–10.8)

## 2018-06-11 PROCEDURE — G8989 SELF CARE D/C STATUS: HCPCS | Mod: CJ

## 2018-06-11 PROCEDURE — 97165 OT EVAL LOW COMPLEX 30 MIN: CPT

## 2018-06-11 PROCEDURE — 700102 HCHG RX REV CODE 250 W/ 637 OVERRIDE(OP): Performed by: INTERNAL MEDICINE

## 2018-06-11 PROCEDURE — A9270 NON-COVERED ITEM OR SERVICE: HCPCS | Performed by: HOSPITALIST

## 2018-06-11 PROCEDURE — 99217 PR OBSERVATION CARE DISCHARGE: CPT | Performed by: INTERNAL MEDICINE

## 2018-06-11 PROCEDURE — A9270 NON-COVERED ITEM OR SERVICE: HCPCS | Performed by: INTERNAL MEDICINE

## 2018-06-11 PROCEDURE — 700102 HCHG RX REV CODE 250 W/ 637 OVERRIDE(OP): Performed by: HOSPITALIST

## 2018-06-11 PROCEDURE — 80069 RENAL FUNCTION PANEL: CPT

## 2018-06-11 PROCEDURE — G8987 SELF CARE CURRENT STATUS: HCPCS | Mod: CJ

## 2018-06-11 PROCEDURE — G0378 HOSPITAL OBSERVATION PER HR: HCPCS

## 2018-06-11 PROCEDURE — G8988 SELF CARE GOAL STATUS: HCPCS | Mod: CJ

## 2018-06-11 PROCEDURE — 85025 COMPLETE CBC W/AUTO DIFF WBC: CPT

## 2018-06-11 PROCEDURE — 36415 COLL VENOUS BLD VENIPUNCTURE: CPT

## 2018-06-11 RX ADMIN — OXYCODONE HYDROCHLORIDE 5 MG: 5 TABLET ORAL at 09:33

## 2018-06-11 RX ADMIN — DOCUSATE SODIUM AND SENNOSIDES 2 TABLET: 8.6; 5 TABLET, FILM COATED ORAL at 08:20

## 2018-06-11 RX ADMIN — OXYCODONE HYDROCHLORIDE 5 MG: 5 TABLET ORAL at 04:38

## 2018-06-11 ASSESSMENT — PAIN SCALES - GENERAL
PAINLEVEL_OUTOF10: 6
PAINLEVEL_OUTOF10: 2
PAINLEVEL_OUTOF10: 8
PAINLEVEL_OUTOF10: 4
PAINLEVEL_OUTOF10: 2

## 2018-06-11 ASSESSMENT — COGNITIVE AND FUNCTIONAL STATUS - GENERAL
SUGGESTED CMS G CODE MODIFIER DAILY ACTIVITY: CJ
DRESSING REGULAR UPPER BODY CLOTHING: A LITTLE
HELP NEEDED FOR BATHING: A LITTLE
PERSONAL GROOMING: A LITTLE
DRESSING REGULAR LOWER BODY CLOTHING: A LITTLE
DAILY ACTIVITIY SCORE: 20

## 2018-06-11 ASSESSMENT — ACTIVITIES OF DAILY LIVING (ADL): TOILETING: INDEPENDENT

## 2018-06-11 NOTE — PROGRESS NOTES
Report received from Day RN, assumed care of pt at this time. POC and medications reviewed with pt. Pt verbalized understanding. Pt c/o pain in R shoulder. Will medicate per MAR. Safety measures in place. Will continue to monitor.

## 2018-06-11 NOTE — DISCHARGE INSTRUCTIONS
Discharge Instructions    Non weight bearing on right arm for  6 weeks;   strict elevation  sling for comfort splint to remain on, clean, and dry until clinic followup.   Follow up with Dr. Pryor in 10-14 days     Discharged to home by car with relative. Discharged via wheelchair, hospital escort: Yes.  Special equipment needed: Not Applicable    Be sure to schedule a follow-up appointment with your primary care doctor or any specialists as instructed.     Discharge Plan:   Diet Plan: Discussed  Activity Level: Discussed  Confirmed Follow up Appointment: Patient to Call and Schedule Appointment  Confirmed Symptoms Management: Discussed  Medication Reconciliation Updated: Yes  Pneumococcal Vaccine Administered/Refused: Not given - Patient refused pneumococcal vaccine  Influenza Vaccine Indication: Patient Refuses    I understand that a diet low in cholesterol, fat, and sodium is recommended for good health. Unless I have been given specific instructions below for another diet, I accept this instruction as my diet prescription.   Other diet: regular    Special Instructions: Discharge instructions for the Orthopedic Patient    Follow up with Primary Care Physician within 2 weeks of discharge to home, regarding:  Review of medications and diagnostic testing.  Surveillance for medical complications.  Workup and treatment of osteoporosis, if appropriate.     -Is this a Joint Replacement patient? No    -Is this patient being discharged with medication to prevent blood clots?  No    Is patient discharged on Warfarin / Coumadin?         Open Reduction, Internal Fixation (ORIF), Generic  Usually, if bones are broken (fractured) and are out of place, unstable, or may become out of place, surgery is needed. This surgery is called an open reduction and internal fixation (ORIF). Open reduction means that the area of the fracture is opened up so the surgeon can see it. Internal fixation means that screws, pins, or fixation  devices are used to hold the bone pieces in place.  LET YOUR CAREGIVER KNOW ABOUT:   Allergies.  Medicines taken, including herbs, eyedrops, over-the-counter medicines, and creams.  Use of steroids (by mouth or creams).  Previous problems with anesthetics or numbing medicines.  History of bleeding or blood problems.  History of blood clots.  Possibility of pregnancy, if this applies.  Previous surgery.  Other health problems.  RISKS AND COMPLICATIONS   All surgery is associated with risks. Some of these risks are:  Excessive bleeding.  Infection.  Imperfect results with loss of joint function.  BEFORE THE PROCEDURE   Usually, surgery is performed shortly after the injury. It is important to provide information to your caregiver after your injury.  AFTER THE PROCEDURE   After surgery, you will be taken to a recovery area where a nurse will monitor your progress. You may have a long, narrow tube(catheter) in the bladder following surgery that helps you pass your water. When awake, stable, taking fluids well, and without complications, you will be returned to your room. You will receive physical therapy and other care. Physical therapy is done until you are doing well and your caregiver feels it is safe for you to go home or to an extended care facility.  Following surgery, the bones may be protected with a cast. The type of casting depends on where the fracture was. Casts are generally left in place for about 5 to 6 weeks. During this time, your caregiver will follow your progress. X-rays may be taken during healing to make sure the bones stay in place.  HOME CARE INSTRUCTIONS   You or your child may resume normal diet and activities as directed or allowed.  Put ice on the injured area.  Put ice in a plastic bag.  Place a towel between the skin and the bag.  Leave the ice on for 15-20 minutes at a time, 3-4 times a day, for the first 2 days following surgery.  Change bandages (dressings) if necessary or as  directed.  If given a plaster or fiberglass cast:  Do not try to scratch the skin under the cast using sharp or pointed objects.  Check the skin around the cast every day. You may put lotion on any red or sore areas.  Keep the cast dry and clean.  Do not put pressure on any part of the cast or splint until it is fully hardened.  The cast or splint can be protected during bathing with a plastic bag. Do not lower the cast or splint into water.  Only take over-the-counter or prescription medicines for pain, discomfort, or fever as directed by your caregiver.  Use crutches as directed and do not exercise the leg unless instructed.  If the bones get out of position (displaced), it may eventually lead to arthritis and lasting disability. Problems can follow even the best of care. Follow the directions of your caregiver.  Follow all instructions given by your caregiver, make and keep follow-up appointments, and use crutches as directed.  SEEK IMMEDIATE MEDICAL CARE IF:   There is redness, swelling, numbness, or increasing pain in the wound.  There is pus coming from the wound.  You or your child has an oral temperature above 102° F (38.9° C), not controlled by medicine.  A bad smell is coming from the wound or dressing.  The wound breaks open (edges not staying together) after stitches (sutures) or staples have been removed.  The skin or nails below the injury turn blue or gray, or feel cold or numb.  There is severe pain under the cast or in the foot.  If there is not a window in the cast for observing the wound, a discharge or minor bleeding may show up as a stain on the outside of the cast. Report these findings to your caregiver.  MAKE SURE YOU:   Understand these instructions.  Will watch your condition.  Will get help right away if you are not doing well or gets worse.  Document Released: 12/29/2007 Document Revised: 03/11/2013 Document Reviewed: 12/05/2008  ExitCare® Patient Information ©2014 [employer].  Arm  Sling Use  The arm sling keeps the injured arm raised up. This helps reduce pain and swelling.  HOME CARE INSTRUCTIONS   Adjust the sling to keep your forearm level and comfortable.   Make sure that the hand of the injured arm does not droop down. That could stretch some nerves in the wrist.   Support your hand in the sling if possible.   Let your caregiver know if you have any problems.   SEEK IMMEDIATE MEDICAL CARE IF:   You have an increase in bruising, swelling, or pain in the area of your injury   You notice a blue color of or coldness in your fingers.   Pain relief is not obtained with medications or any of your problems are getting worse.   MAKE SURE YOU:   Understand these instructions.   Will watch your condition.   Will get help right away if you are not doing well or get worse.   Document Released: 10/28/2005 Document Revised: 03/11/2013 Document Reviewed: 02/23/2009  · ExitCare® Patient Information ©2013 [employer].  No       Wrist Fracture Treated With ORIF  Introduction  A wrist fracture is a break or crack in one of the bones of your wrist. Your wrist is made up of eight small bones at the palm of your hand (carpal bones) and two long bones that make up your forearm (radius and ulna).  If your fracture is displaced, that means that one or more parts of a bone have been moved out of normal position and the normal alignment between bones is destroyed. This type of fracture is treated with a surgical procedure that is called open reduction with internal fixation (ORIF). In this procedure, the bone pieces are put back together, and they are held in place by plates, screws, or other types of hardware. The procedure helps the bones to heal properly.  Tell a health care provider about:  · Any allergies you have.  · All medicines you are taking, including vitamins, herbs, eye drops, creams, and over-the-counter medicines.  · Any problems you or family members have had with anesthetic medicines.  · Any  blood disorders you have.  · Any surgeries you have had.  · Any medical conditions you have.  · Whether you are pregnant or may be pregnant.  What are the risks?  Generally, this is a safe procedure. However, problems may occur, including:  · Infection.  · Bleeding.  · Allergic reactions to medicines.  · Damage to other structures or organs.  What happens before the procedure?  · Follow instructions from your health care provider about eating or drinking restrictions.  · Ask your health care provider about:  ¨ Changing or stopping your regular medicines. This is especially important if you are taking diabetes medicines or blood thinners.  ¨ Taking medicines such as aspirin and ibuprofen. These medicines can thin your blood. Do not take these medicines before your procedure if your health care provider instructs you not to.  · Plan to have someone take you home after the procedure.  · If you will be going home right after the procedure, plan to have someone with you for 24 hours.  · Ask your health care provider how your surgical site will be marked or identified.  · You may be given antibiotic medicine to help prevent infection.  What happens during the procedure?  · To reduce your risk of infection:  ¨ Your health care team will wash or sanitize their hands.  ¨ Your skin will be washed with soap.  · An IV tube will be inserted into one of your veins.  · You will be given one or more of the following:  ¨ A medicine to help you relax (sedative).  ¨ A medicine to numb the area (local anesthetic).  ¨ A medicine to make you fall asleep (general anesthetic).  ¨ A medicine that is injected into an area of your body to numb everything below the injection site (regional anesthetic).  · The surgeon will make an incision through your skin to expose the areas of the fracture.  · The broken bones will be returned to their normal positions. To hold the bones in place, the surgeon will use screws and a metal plate or different  types of wiring.  · The surgeon will close the incision with stitches (sutures) or staples.  · A bandage (dressing) will be placed over your incision.  The procedure may vary among health care providers and hospitals.  What happens after the procedure?  · Your blood pressure, heart rate, breathing rate, and blood oxygen level will be monitored often until the medicines you were given have worn off.  · You will be given medicine as needed for pain.  · Do not drive for 24 hours if you received a sedative.  · You may have physical therapy while you are in the hospital.  This information is not intended to replace advice given to you by your health care provider. Make sure you discuss any questions you have with your health care provider.  Document Released: 11/28/2016 Document Revised: 05/25/2017 Document Reviewed: 08/31/2016  © 2017 Elsevier    Oxycodone tablets or capsules  What is this medicine?  OXYCODONE (ox i KOE done) is a pain reliever. It is used to treat moderate to severe pain.  This medicine may be used for other purposes; ask your health care provider or pharmacist if you have questions.  COMMON BRAND NAME(S): Dazidox, Endocodone, Oxaydo, OXECTA, OxyIR, Percolone, Roxicodone, ROXYBOND  What should I tell my health care provider before I take this medicine?  They need to know if you have any of these conditions:  -Steve's disease  -brain tumor  -head injury  -heart disease  -history of drug or alcohol abuse problem  -if you often drink alcohol  -kidney disease  -liver disease  -lung or breathing disease, like asthma  -mental illness  -pancreatic disease  -seizures  -thyroid disease  -an unusual or allergic reaction to oxycodone, codeine, hydrocodone, morphine, other medicines, foods, dyes, or preservatives  -pregnant or trying to get pregnant  -breast-feeding  How should I use this medicine?  Take this medicine by mouth with a glass of water. Follow the directions on the prescription label. You can take  it with or without food. If it upsets your stomach, take it with food. Take your medicine at regular intervals. Do not take it more often than directed. Do not stop taking except on your doctor's advice.  Some brands of this medicine, like Oxecta, have special instructions. Ask your doctor or pharmacist if these directions are for you: Do not cut, crush or chew this medicine. Swallow only one tablet at a time. Do not wet, soak, or lick the tablet before you take it.  A special MedGuide will be given to you by the pharmacist with each prescription and refill. Be sure to read this information carefully each time.  Talk to your pediatrician regarding the use of this medicine in children. Special care may be needed.  Overdosage: If you think you have taken too much of this medicine contact a poison control center or emergency room at once.  NOTE: This medicine is only for you. Do not share this medicine with others.  What if I miss a dose?  If you miss a dose, take it as soon as you can. If it is almost time for your next dose, take only that dose. Do not take double or extra doses.  What may interact with this medicine?  This medicine may interact with the following medications:  -alcohol  -antihistamines for allergy, cough and cold  -antiviral medicines for HIV or AIDS  -atropine  -certain antibiotics like clarithromycin, erythromycin, linezolid, rifampin  -certain medicines for anxiety or sleep  -certain medicines for bladder problems like oxybutynin, tolterodine  -certain medicines for depression like amitriptyline, fluoxetine, sertraline  -certain medicines for fungal infections like ketoconazole, itraconazole, voriconazole  -certain medicines for migraine headache like almotriptan, eletriptan, frovatriptan, naratriptan, rizatriptan, sumatriptan, zolmitriptan  -certain medicines for nausea or vomiting like dolasetron, ondansetron, palonosetron  -certain medicines for Parkinson's disease like benztropine,  trihexyphenidyl  -certain medicines for seizures like phenobarbital, phenytoin, primidone  -certain medicines for stomach problems like dicyclomine, hyoscyamine  -certain medicines for travel sickness like scopolamine  -diuretics  -general anesthetics like halothane, isoflurane, methoxyflurane, propofol  -ipratropium  -local anesthetics like lidocaine, pramoxine, tetracaine  -MAOIs like Carbex, Eldepryl, Marplan, Nardil, and Parnate  -medicines that relax muscles for surgery  -methylene blue  -nilotinib  -other narcotic medicines for pain or cough  -phenothiazines like chlorpromazine, mesoridazine, prochlorperazine, thioridazine  This list may not describe all possible interactions. Give your health care provider a list of all the medicines, herbs, non-prescription drugs, or dietary supplements you use. Also tell them if you smoke, drink alcohol, or use illegal drugs. Some items may interact with your medicine.  What should I watch for while using this medicine?  Tell your doctor or health care professional if your pain does not go away, if it gets worse, or if you have new or a different type of pain. You may develop tolerance to the medicine. Tolerance means that you will need a higher dose of the medicine for pain relief. Tolerance is normal and is expected if you take this medicine for a long time.  Do not suddenly stop taking your medicine because you may develop a severe reaction. Your body becomes used to the medicine. This does NOT mean you are addicted. Addiction is a behavior related to getting and using a drug for a non-medical reason. If you have pain, you have a medical reason to take pain medicine. Your doctor will tell you how much medicine to take. If your doctor wants you to stop the medicine, the dose will be slowly lowered over time to avoid any side effects.  There are different types of narcotic medicines (opiates). If you take more than one type at the same time or if you are taking another  medicine that also causes drowsiness, you may have more side effects. Give your health care provider a list of all medicines you use. Your doctor will tell you how much medicine to take. Do not take more medicine than directed. Call emergency for help if you have problems breathing or unusual sleepiness.  You may get drowsy or dizzy. Do not drive, use machinery, or do anything that needs mental alertness until you know how the medicine affects you. Do not stand or sit up quickly, especially if you are an older patient. This reduces the risk of dizzy or fainting spells. Alcohol may interfere with the effect of this medicine. Avoid alcoholic drinks.  This medicine will cause constipation. Try to have a bowel movement at least every 2 to 3 days. If you do not have a bowel movement for 3 days, call your doctor or health care professional.  Your mouth may get dry. Chewing sugarless gum or sucking hard candy, and drinking plenty of water may help. Contact your doctor if the problem does not go away or is severe.  What side effects may I notice from receiving this medicine?  Side effects that you should report to your doctor or health care professional as soon as possible:  -allergic reactions like skin rash, itching or hives, swelling of the face, lips, or tongue  -breathing problems  -confusion  -signs and symptoms of low blood pressure like dizziness; feeling faint or lightheaded, falls; unusually weak or tired  -trouble passing urine or change in the amount of urine  -trouble swallowing  Side effects that usually do not require medical attention (report to your doctor or health care professional if they continue or are bothersome):  -constipation  -dry mouth  -nausea, vomiting  -tiredness  This list may not describe all possible side effects. Call your doctor for medical advice about side effects. You may report side effects to FDA at 8-160-FDA-4611.  Where should I keep my medicine?  Keep out of the reach of children.  This medicine can be abused. Keep your medicine in a safe place to protect it from theft. Do not share this medicine with anyone. Selling or giving away this medicine is dangerous and against the law.  Store at room temperature between 15 and 30 degrees C (59 and 86 degrees F). Protect from light. Keep container tightly closed.  This medicine may cause accidental overdose and death if it is taken by other adults, children, or pets. Flush any unused medicine down the toilet to reduce the chance of harm. Do not use the medicine after the expiration date.  NOTE: This sheet is a summary. It may not cover all possible information. If you have questions about this medicine, talk to your doctor, pharmacist, or health care provider.  © 2018 Elsevier/Gold Standard (2016-11-01 16:55:57)    Depression / Suicide Risk    As you are discharged from this [facility] facility, it is important to learn how to keep safe from harming yourself.    Recognize the warning signs:  · Abrupt changes in personality, positive or negative- including increase in energy   · Giving away possessions  · Change in eating patterns- significant weight changes-  positive or negative  · Change in sleeping patterns- unable to sleep or sleeping all the time   · Unwillingness or inability to communicate  · Depression  · Unusual sadness, discouragement and loneliness  · Talk of wanting to die  · Neglect of personal appearance   · Rebelliousness- reckless behavior  · Withdrawal from people/activities they love  · Confusion- inability to concentrate     If you or a loved one observes any of these behaviors or has concerns about self-harm, here's what you can do:  · Talk about it- your feelings and reasons for harming yourself  · Remove any means that you might use to hurt yourself (examples: pills, rope, extension cords, firearm)  · Get professional help from the community (Mental Health, Substance Abuse, psychological counseling)  · Do not be alone:Call  your Safe Contact- someone whom you trust who will be there for you.  · Call your local CRISIS HOTLINE 016-0164 or 729-307-1851  · Call your local Children's Mobile Crisis Response Team [place] (327) 167-0994 or www.Spacecom  · Call the toll free National Suicide Prevention Hotlines   · National Suicide Prevention Lifeline 039-670-IZOI (4278)  · National [employer] Line Network 800-SUICIDE (394-7291)

## 2018-06-11 NOTE — PROGRESS NOTES
Discharge instructions given and discussed. Pt working with OT at this time. No acute changes. Pt denies SOB or CP. Pt on room air without signs of respiratory distress. Pt discharging home with family. RX for oxycodone given.

## 2018-06-11 NOTE — PROGRESS NOTES
"Patient seen and examined  Pain moderate as nerve block wears off    Blood pressure 140/74, pulse 80, temperature 36.7 °C (98 °F), resp. rate 18, height 1.676 m (5' 6\"), weight 94 kg (207 lb 3.7 oz), SpO2 90 %.    Recent Labs      06/09/18   1835  06/10/18   0509  06/11/18   0526   WBC  12.4*  9.9  13.3*   RBC  4.95  4.69*  4.38*   HEMOGLOBIN  15.4  14.3  13.6*   HEMATOCRIT  45.3  42.7  40.6*   MCV  91.5  91.0  92.7   MCH  31.1  30.5  31.1   MCHC  34.0  33.5*  33.5*   RDW  43.4  43.5  44.6   PLATELETCT  226  215  207   MPV  10.4  11.1  10.7       No acute distress  Dressing clean dry and intact  Neurovascularly intact    POD#1 ORIF right distal radius    Plan:  DVT Prophylaxis- TEDS/SCDs, no outpatient chemical indicated   Weight Bearing Status-NWB RUE in splint until outpatient f/u; sling for comfort  OT for gentle finger ROM which is encouraged  Okay to d/c home when pain controlled and cleared by hospitalist.   Case Coordination          "

## 2018-06-11 NOTE — PROGRESS NOTES
Pt states he does not feel comfortable to go home today. Pt states he does feel SOB with activity. Pt does not have respiratory distress at rest but is still requiring 1 iter of oxygen. Pt desaturates to 87% on room air. Pt educated on using IS, pt demonstrates understanding. Dr. Weems made aware.orders received to cancel discharge.

## 2018-06-11 NOTE — THERAPY
"Occupational Therapy Evaluation completed.   Functional Status:  Pt is a 72 y/o male, admit after a GLF with resultant R UE FX. Pt is visiting his sister , will be staying until August. Pt PLOF- I with AMB ADLS without the use of a device. Pt presents with R hand edema, pain, and decreased I with ADLS due to this. Pt was educated regarding PROM, in R hand, edema management and ADLS with cast. Pt was able to verbalized understanding and return demonstrate technique. Pt will be seen for initial evaluation and treatment only.   Plan of Care: Patient with no further skilled OT needs in the acute care setting at this time  Discharge Recommendations:  Equipment: No Equipment Needed. Post-acute therapy Discharge to home with outpatient or home health for additional skilled therapy services.    See \"Rehab Therapy-Acute\" Patient Summary Report for complete documentation.    "

## 2018-06-14 NOTE — OP REPORT
DATE OF SERVICE:  06/10/2018    SURGEON:  Ryan Pryor MD    ASSISTANT:  None.    PREOPERATIVE DIAGNOSIS:  Right comminuted intraarticular displaced distal   radius fracture.    POSTOPERATIVE DIAGNOSIS:  Right comminuted intraarticular displaced distal   radius fracture.    PROCEDURE:  Open reduction and internal fixation, right distal radius   fracture.    IMPLANTS:  One TriMed dorsal bridge plate with multiple nonlocking screws.    ANESTHESIOLOGIST:  Rachid Walker MD    ANESTHESIA:  General with upper extremity nerve block for pain control.    COMPLICATIONS:  None noted.    DRAINS:  None.    SPECIMENS:  None.    ESTIMATED BLOOD LOSS:  Minimal.    INDICATIONS:  The patient is a 73-year-old gentleman, status post fall ____   with the above-mentioned injury.  We discussed the nature of his injury,   instability, nature of significant displacement.  We discussed conservative   versus operative treatment.  After discussing the advantages and disadvantages   of each, he elected to proceed with the above-mentioned procedure.  Prior to   the procedure, he understood the risks, benefits and alternatives to surgery.    He understood the risks to include, but not limited to the risk of infection   requiring repeat surgery, bleeding requiring blood transfusion, nerve, blood   vessel, tendon injury requiring repair, chronic pain, nonunion, malunion,   hardware failure, posttraumatic arthritis requiring revision or salvage   procedure, DVT, pulmonary embolism, heart attack, stroke, and even death.  The   patient states despite these risks, he wished to proceed with surgery.    DESCRIPTION OF PROCEDURE:  The patient was met in the preoperative holding   area.  His right wrist was initialed as correct operative site.  He had an   opportunity to ask questions, all questions were answered and informed consent   was obtained.    The patient was brought to the operating room and laid supine on the operating   room table.   All bony and dependent prominences were well padded.  Upper   extremity nerve block and general anesthesia were induced without   complication.  Ancef was administered for infection prophylaxis.  Right upper   extremity was prepped and draped in the usual sterile fashion.  A formal   time-out was performed, in which all parties agreed upon the correct patient,   procedure, and operative site.  I began the procedure by examining the wrist   under fluoroscopy.  I pulled longitudinal traction.  With ulnar deviation of   the wrist, I was able to reduce the fracture with traction to a near anatomic   alignment; however, it was extremely unstable on the axial plane as well as   coronal and sagittal planes, it had tendency to collapse with severe ulnar   positive variance, severe radial collapse and volar subluxation; however, I   was able to reduce it with traction.  I therefore felt a bridge plate was   ideal fixation and made a longitudinal incision from the index metacarpal to   the dorsal aspect of the wrist.  I identified and protected the dorsal radial   sensory nerves.  I then split the bridge plate subperiosteally to the second   compartment, pulled longitudinal traction, fixed the bridge plate to the index   metacarpal with multiple nonlocking screws, which had excellent fixation.  I   then used bone reduction clamps to reduce the plate to the radial shaft.  I   corrected the volar tilt approximately 10 degrees now with the carpus centered   over the radius, corrected the radial collapse, corrected the ulnar variance   to neutral as well as corrected radial inclination and fixed the proximal   aspect of the plate to the shaft with multiple 3.5 cortical screws, which had   excellent purchase.  I then obtained final fluoroscopic views confirming near   anatomic reduction and also restoration of the joint line to less than or   equal to 2 mm.  I then deflated the tourniquet, used Bovie cautery to achieve    hemostasis, copiously irrigated the wound with normal saline, closed the   incision with 2-0 Monocryl sutures and skin stapler, covered with sterile   Xeroform, 4x4s, and a well-padded splint.  Also, of note, prior to placement   of the splint, I tested the distal radial ulnar joint for stability, it was   stable to translation in full pronation and supination and neutral.    POSTOPERATIVE PLAN:  The patient will be admitted back to the hospitalist for   pain control, likely discharged home on postoperative day#0 or #1, sedentary   use of the hand only, and follow up with me in clinic in 10-14 days for staple   removal and wound check and casting.       ____________________________________     MD RYAN Kaiser / MAYELA    DD:  06/14/2018 06:43:20  DT:  06/14/2018 07:23:51    D#:  [id number]  Job#:  [id number]

## 2018-06-14 NOTE — CONSULTS
DATE OF SERVICE:  06/10/2018    ORTHOPEDIC CONSULTATION    CHIEF COMPLAINT:  Right wrist pain.    HISTORY OF PRESENT ILLNESS:  The patient is a 73-year-old right-hand dominant   gentleman who slipped in his driveway, landing onto an outstretched right   wrist.  He was brought to [facility], found to have a highly   comminuted intraarticular closed distal radius fracture.  I was consulted as   the orthopedic surgeon on call.  Upon seeing the patient, he reports his pain   is aching pain in his wrist approximately 8/10 severity.  He noticed gross   deformity of his wrist and inability to use his hand due to pain.  Pain has   been somewhat amenable to splint, ice, and pain medication.  He denies any   numbness in the hand.    PAST MEDICAL HISTORY:  None.    PAST SURGICAL HISTORY:  Right foot surgery.    MEDICATIONS:  None.    ALLERGIES:  No known drug allergies.    FAMILY HISTORY:  Negative for bleeding disorders or anesthetic reactions.    SOCIAL HISTORY:  He drinks alcohol occasionally.  Denies tobacco or drug use.    REVIEW OF SYSTEMS:  Thorough review of systems is performed and is negative   except for past medical history and history of present illness.    PHYSICAL EXAMINATION:  GENERAL:  He is healthy in appearance.  HEENT:  Normocephalic, atraumatic.  Cranial nerves II-XII are grossly intact.  CARDIOVASCULAR:  2+ distal pulses.  EXTREMITIES:  No cyanosis, clubbing, or edema.  PULMONARY:  Breathing comfortably on room air without labor.  ABDOMEN:  Flat and unremarkable.  SKIN:  No rashes, jaundice, or cyanosis.  SPINE:  Clinically midline.  GAIT:  Currently, nonambulatory in a hospital bed.  MUSCULOSKELETAL:  Left upper extremity and bilateral lower extremities, no   tenderness to palpation, pain with range of motion.  Right upper extremity, he   has severe gross deformity of the radial collapse and dinner fork deformity,   moderate soft tissue swelling.  No blistering, skin wrinkles ____ palpation    about the base of his thumb.  NEUROLOGIC:  Right hand sensation intact to light touch in median, radial,   ulnar nerves.    IMAGING:  Multiple views of the right wrist as well as CT scan demonstrates a   highly comminuted intraarticular distal radius fracture with significant   radial collapse, volar subluxation of the carpus.  Additionally, he has   degenerative changes of his thumb CMC joint.    ASSESSMENT:  This is a 73-year-old right-hand dominant male with a highly   comminuted displaced intraarticular right distal radius fracture.    PLAN:  I educated the patient regarding his diagnosis.  We discussed   conservative versus operative treatment.  Discussed advantages and   disadvantages of each, he elected to proceed with surgery for which I   recommend open reduction and internal fixation likely with a bridge plate   technique ____ which would require subsequent hardware removal given the   comminuted nature of this fracture, I think it is very length unstable.    Patient understands risks, benefits and alternatives of this procedure and   wishes to proceed.       ____________________________________     MD RYAN Kaiser / MAYELA    DD:  06/14/2018 06:39:01  DT:  06/14/2018 07:28:20    D#:  [id number]  Job#:  [id number]

## 2018-06-16 ENCOUNTER — HOSPITAL ENCOUNTER (EMERGENCY)
Facility: MEDICAL CENTER | Age: 73
End: 2018-06-16
Attending: EMERGENCY MEDICINE
Payer: COMMERCIAL

## 2018-06-16 VITALS
SYSTOLIC BLOOD PRESSURE: 128 MMHG | HEART RATE: 92 BPM | OXYGEN SATURATION: 93 % | RESPIRATION RATE: 18 BRPM | HEIGHT: 66 IN | TEMPERATURE: 99.4 F | BODY MASS INDEX: 33.3 KG/M2 | DIASTOLIC BLOOD PRESSURE: 78 MMHG | WEIGHT: 207.23 LBS

## 2018-06-16 DIAGNOSIS — M79.641 PAIN OF RIGHT HAND: ICD-10-CM

## 2018-06-16 PROCEDURE — 99283 EMERGENCY DEPT VISIT LOW MDM: CPT

## 2018-06-16 ASSESSMENT — PAIN SCALES - GENERAL: PAINLEVEL_OUTOF10: 2

## 2018-06-17 NOTE — ED PROVIDER NOTES
"ED Provider Note    CHIEF COMPLAINT  Chief Complaint   Patient presents with   • Post-Op Complications     Pt had ORIF of right wrist on Sunday. Pt reports discoloration to all fingers on right hand and numbness to 2nd and 3rd digit. Obvious discoloration, extremity is warm. Unable to assess radial pulse d/t casting.        HPI  Maynor Pringle is a 73 y.o. male who presents status post ORIF of right wrist on Sunday, one week ago by Dr. Ryan De Jesus.  Patient was doing well until yesterday when he started to notice numbness of the dorsum at the base of the right index and middle fingers.  The patient also noticed that the fingers were becoming darker in color.  He denies any motor deficits.  He also has some pain when he flexes the thumb towards the palm.    REVIEW OF SYSTEMS  See HPI for further details. All other systems are negative.     PAST MEDICAL HISTORY   has a past medical history of Cancer (HCC) and Cataract.    SOCIAL HISTORY  Social History     Social History Main Topics   • Smoking status: Former Smoker   • Smokeless tobacco: Never Used   • Alcohol use Yes      Comment: Occasionally   • Drug use: No   • Sexual activity: Not on file       SURGICAL HISTORY   has a past surgical history that includes other orthopedic surgery; cataract phaco with iol (Right, 11/2016); and wrist orif (Right, 6/10/2018).    CURRENT MEDICATIONS  Home Medications     Reviewed by Violetta Goodrich R.N. (Registered Nurse) on 06/16/18 at 2050  Med List Status: Not Addressed   Medication Last Dose Status   oxyCODONE immediate-release (ROXICODONE) 5 MG Tab  Active                ALLERGIES  No Known Allergies    PHYSICAL EXAM  VITAL SIGNS: /78   Pulse 92   Temp 37.4 °C (99.4 °F)   Resp 18   Ht 1.676 m (5' 6\")   Wt 94 kg (207 lb 3.7 oz)   SpO2 93%   BMI 33.45 kg/m²  @CHARLEY[[id number]::@   Pulse ox interpretation: I interpret this pulse ox as normal.  Constitutional: Alert in no apparent distress.  HENT: No signs of " trauma, Bilateral external ears normal, Nose normal.   Eyes: Pupils are equal and reactive, Conjunctiva normal, Non-icteric.   Neck: Normal range of motion, No tenderness, Supple, No stridor.   Lymphatic: No lymphadenopathy noted.   Skin: Warm, Dry, No erythema, No rash.   Extremities: The patient has warmth of his hand, he has good motor function, he has subjective numbness at the base of the dorsum of the right index and middle fingers.  His fingers appear to have hematoma with a darker discoloration.  Currently there does not appear to be any signs of compartment syndrome.  Neurologic: Alert , Normal motor function, Normal sensory function, No focal deficits noted.   Psychiatric: Affect normal, Judgment normal, Mood normal.             COURSE & MEDICAL DECISION MAKING  Pertinent Labs & Imaging studies reviewed. (See chart for details)    I spoke with Dr. Bruce simpson who is on-call for Dr. De Jesus.  At this time he would not like me to remove the cast which I agree with.  We feel that this does not represent compartment syndrome and likely is a hematoma.  The patient will be followed up at his appointment on Tuesday morning with Dr. De Jesus.  He will return for worsening symptoms.    The patient will return for new or worsening symptoms and is stable at the time of discharge.    The patient is referred to his primary physician for blood pressure management, diabetic screening, and for all other preventative health concerns.        DISPOSITION:  Patient will be discharged home in stable condition.    FOLLOW UP:  [facility], Emergency Dept  [address]  [place] [id number]  [phone]    If symptoms worsen    Ryan Pryor M.D.  [address] [place] [address]  [phone]      Follow-up as scheduled      OUTPATIENT MEDICATIONS:  New Prescriptions    No medications on file       The patient will return for worsening symptoms and is stable at the  time of discharge. The patient verbalizes understanding and will comply.    FINAL IMPRESSION  1.  Acute right hand numbness and discoloration status post ORIF  2.   3.         Electronically signed by: Fer Lemos, 6/16/2018 9:10 PM

## 2018-06-17 NOTE — ED NOTES
"Chief Complaint   Patient presents with   • Post-Op Complications     Pt had ORIF of right wrist on Sunday. Pt reports discoloration to all fingers on right hand and numbness to 2nd and 3rd digit. Obvious discoloration, extremity is warm. Unable to assess radial pulse d/t casting.      /78   Pulse 92   Temp 37.4 °C (99.4 °F)   Resp 18   Ht 1.676 m (5' 6\")   Wt 94 kg (207 lb 3.7 oz)   SpO2 93%   BMI 33.45 kg/m²     "

## 2018-06-17 NOTE — ED NOTES
Discharge instructions provided.  Pt verbalized the understanding of discharge instructions to follow up with Ortho.  Pt ambulated out of ER without difficulty.

## 2018-08-02 ENCOUNTER — APPOINTMENT (OUTPATIENT)
Dept: ADMISSIONS | Facility: MEDICAL CENTER | Age: 73
End: 2018-08-02
Attending: SURGERY
Payer: COMMERCIAL

## 2018-08-02 DIAGNOSIS — Z01.812 PRE-PROCEDURAL LABORATORY EXAMINATION: ICD-10-CM

## 2018-08-02 LAB
ERYTHROCYTE [DISTWIDTH] IN BLOOD BY AUTOMATED COUNT: 43.9 FL (ref 35.9–50)
HCT VFR BLD AUTO: 47.8 % (ref 42–52)
HGB BLD-MCNC: 16 G/DL (ref 14–18)
MCH RBC QN AUTO: 30.5 PG (ref 27–33)
MCHC RBC AUTO-ENTMCNC: 33.5 G/DL (ref 33.7–35.3)
MCV RBC AUTO: 91 FL (ref 81.4–97.8)
PLATELET # BLD AUTO: 244 K/UL (ref 164–446)
PMV BLD AUTO: 10.7 FL (ref 9–12.9)
RBC # BLD AUTO: 5.25 M/UL (ref 4.7–6.1)
WBC # BLD AUTO: 6.8 K/UL (ref 4.8–10.8)

## 2018-08-02 PROCEDURE — 36415 COLL VENOUS BLD VENIPUNCTURE: CPT

## 2018-08-02 PROCEDURE — 85027 COMPLETE CBC AUTOMATED: CPT

## 2018-08-08 ENCOUNTER — APPOINTMENT (OUTPATIENT)
Dept: RADIOLOGY | Facility: MEDICAL CENTER | Age: 73
End: 2018-08-08
Attending: SURGERY
Payer: COMMERCIAL

## 2018-08-08 ENCOUNTER — HOSPITAL ENCOUNTER (OUTPATIENT)
Facility: MEDICAL CENTER | Age: 73
End: 2018-08-08
Attending: SURGERY | Admitting: SURGERY
Payer: COMMERCIAL

## 2018-08-08 VITALS
HEART RATE: 59 BPM | WEIGHT: 202.16 LBS | HEIGHT: 66 IN | OXYGEN SATURATION: 92 % | RESPIRATION RATE: 16 BRPM | TEMPERATURE: 98.9 F | BODY MASS INDEX: 32.49 KG/M2

## 2018-08-08 PROCEDURE — 160009 HCHG ANES TIME/MIN: Performed by: SURGERY

## 2018-08-08 PROCEDURE — 160002 HCHG RECOVERY MINUTES (STAT): Performed by: SURGERY

## 2018-08-08 PROCEDURE — 160048 HCHG OR STATISTICAL LEVEL 1-5: Performed by: SURGERY

## 2018-08-08 PROCEDURE — 700111 HCHG RX REV CODE 636 W/ 250 OVERRIDE (IP)

## 2018-08-08 PROCEDURE — 160036 HCHG PACU - EA ADDL 30 MINS PHASE I: Performed by: SURGERY

## 2018-08-08 PROCEDURE — 160035 HCHG PACU - 1ST 60 MINS PHASE I: Performed by: SURGERY

## 2018-08-08 PROCEDURE — 501838 HCHG SUTURE GENERAL: Performed by: SURGERY

## 2018-08-08 PROCEDURE — 700101 HCHG RX REV CODE 250

## 2018-08-08 PROCEDURE — A9270 NON-COVERED ITEM OR SERVICE: HCPCS

## 2018-08-08 PROCEDURE — 160039 HCHG SURGERY MINUTES - EA ADDL 1 MIN LEVEL 3: Performed by: SURGERY

## 2018-08-08 PROCEDURE — 500881 HCHG PACK, EXTREMITY: Performed by: SURGERY

## 2018-08-08 PROCEDURE — 160028 HCHG SURGERY MINUTES - 1ST 30 MINS LEVEL 3: Performed by: SURGERY

## 2018-08-08 PROCEDURE — 700102 HCHG RX REV CODE 250 W/ 637 OVERRIDE(OP)

## 2018-08-08 RX ORDER — OXYCODONE HCL 5 MG/5 ML
SOLUTION, ORAL ORAL
Status: COMPLETED
Start: 2018-08-08 | End: 2018-08-08

## 2018-08-08 RX ORDER — BUPIVACAINE HYDROCHLORIDE AND EPINEPHRINE 5; 5 MG/ML; UG/ML
INJECTION, SOLUTION EPIDURAL; INTRACAUDAL; PERINEURAL
Status: DISCONTINUED | OUTPATIENT
Start: 2018-08-08 | End: 2018-08-08 | Stop reason: HOSPADM

## 2018-08-08 RX ORDER — SODIUM CHLORIDE, SODIUM LACTATE, POTASSIUM CHLORIDE, CALCIUM CHLORIDE 600; 310; 30; 20 MG/100ML; MG/100ML; MG/100ML; MG/100ML
INJECTION, SOLUTION INTRAVENOUS CONTINUOUS
Status: DISCONTINUED | OUTPATIENT
Start: 2018-08-08 | End: 2018-08-08 | Stop reason: HOSPADM

## 2018-08-08 RX ADMIN — SODIUM CHLORIDE, SODIUM LACTATE, POTASSIUM CHLORIDE, CALCIUM CHLORIDE: 600; 310; 30; 20 INJECTION, SOLUTION INTRAVENOUS at 12:00

## 2018-08-08 RX ADMIN — OXYCODONE HYDROCHLORIDE 10 MG: 5 SOLUTION ORAL at 14:00

## 2018-08-08 RX ADMIN — FENTANYL CITRATE 50 MCG: 50 INJECTION, SOLUTION INTRAMUSCULAR; INTRAVENOUS at 14:03

## 2018-08-08 ASSESSMENT — PAIN SCALES - GENERAL
PAINLEVEL_OUTOF10: 0
PAINLEVEL_OUTOF10: 5

## 2018-08-08 NOTE — DISCHARGE INSTRUCTIONS
ACTIVITY: Rest and take it easy for the first 24 hours.  A responsible adult is recommended to remain with you during that time.  It is normal to feel sleepy.  We encourage you to not do anything that requires balance, judgment or coordination.    MILD FLU-LIKE SYMPTOMS ARE NORMAL. YOU MAY EXPERIENCE GENERALIZED MUSCLE ACHES, THROAT IRRITATION, HEADACHE AND/OR SOME NAUSEA.    FOR 24 HOURS DO NOT:  Drive, operate machinery or run household appliances.  Drink beer or alcoholic beverages.   Make important decisions or sign legal documents.    SPECIAL INSTRUCTIONS:     Okay to remove bandage in 4 days and get incision wet and cover with bandaids.     Elevate above heart and ice frequently for at least 2 weeks. Encourage sedentary use of hand and frequent moving of fingers to prevent stiffness.     No heavy lifting or grasping for at least 4 weeks.     No driving while on narcotic pain medications. Contact auto-insurance carrier for clearance to begin driving again.     Call MD or come to ER for any major concerns.    DIET: To avoid nausea, slowly advance diet as tolerated, avoiding spicy or greasy foods for the first day.  Add more substantial food to your diet according to your physician's instructions.  Babies can be fed formula or breast milk as soon as they are hungry.  INCREASE FLUIDS AND FIBER TO AVOID CONSTIPATION.    SURGICAL DRESSING/BATHING: Sponge baths only until able to remove bandage in 4 days.  Keep splint clean and dry.      FOLLOW-UP APPOINTMENT:  A follow-up appointment should be arranged with your doctor in Call to schedule    You should CALL YOUR PHYSICIAN if you develop:  Fever greater than 101 degrees F.  Pain not relieved by medication, or persistent nausea or vomiting.  Excessive bleeding (blood soaking through dressing) or unexpected drainage from the wound.  Extreme redness or swelling around the incision site, drainage of pus or foul smelling drainage.  Inability to urinate or empty your  bladder within 8 hours.  Problems with breathing or chest pain.    You should call 911 if you develop problems with breathing or chest pain.  If you are unable to contact your doctor or surgical center, you should go to the nearest emergency room or urgent care center.  Physician's telephone #: [phone]    If any questions arise, call your doctor.  If your doctor is not available, please feel free to call the Surgical Center at [phone].  The Center is open Monday through Friday from 7AM to 7PM.  You can also call the [employer] HOTLINE open 24 hours/day, 7 days/week and speak to a nurse at [phone], or toll free at [phone].    A registered nurse may call you a few days after your surgery to see how you are doing after your procedure.    MEDICATIONS: Resume taking daily medication.  Take prescribed pain medication with food.  If no medication is prescribed, you may take non-aspirin pain medication if needed.  PAIN MEDICATION CAN BE VERY CONSTIPATING.  Take a stool softener or laxative such as senokot, pericolace, or milk of magnesia if needed.    Prescription given for None given per pt.  Last pain medication given at Oxycodone last given at 2:00pm.    If your physician has prescribed pain medication that includes Acetaminophen (Tylenol), do not take additional Acetaminophen (Tylenol) while taking the prescribed medication.    Depression / Suicide Risk    As you are discharged from this [facility] facility, it is important to learn how to keep safe from harming yourself.    Recognize the warning signs:  · Abrupt changes in personality, positive or negative- including increase in energy   · Giving away possessions  · Change in eating patterns- significant weight changes-  positive or negative  · Change in sleeping patterns- unable to sleep or sleeping all the time   · Unwillingness or inability to communicate  · Depression  · Unusual sadness, discouragement and loneliness  · Talk of wanting to  die  · Neglect of personal appearance   · Rebelliousness- reckless behavior  · Withdrawal from people/activities they love  · Confusion- inability to concentrate     If you or a loved one observes any of these behaviors or has concerns about self-harm, here's what you can do:  · Talk about it- your feelings and reasons for harming yourself  · Remove any means that you might use to hurt yourself (examples: pills, rope, extension cords, firearm)  · Get professional help from the community (Mental Health, Substance Abuse, psychological counseling)  · Do not be alone:Call your Safe Contact- someone whom you trust who will be there for you.  · Call your local CRISIS HOTLINE 087-3589 or 702-286-4378  · Call your local Children's Mobile Crisis Response Team [place] (940) 184-7013 or www.[employer]  · Call the toll free National Suicide Prevention Hotlines   · National Suicide Prevention Lifeline 937-169-YJIL (7555)  · National Hope Line Network 800-SUICIDE (009-6757)

## 2018-08-08 NOTE — OR NURSING
1324  Pt arrived to PACU from OR with Dr. Jernigan and RN.  LMA in place.  Pt sleeping, maintaining sats on RA.  VSS.  Soft splint noted to R distal arm, cdi.  Pt wiggles R fingers; skin pink, warm.      1336  LMA removed; 1L NC applied as pt had been desatting to 88-89% just prior.  Pt is now maintaining high 90s on 1L.  Pt waking up, denies pain, nausea.      1343  Pt declines family at this time.  Resting comfortably.    1400  Oxycodone given for pain.  Sister at bedside.    1403  Fentanyl given for immediate pain relief    1435  Pt sleeping comfortably.    1450  Pt awake, denies pain, nausea.  Maintaining sats on RA.    1505  Discharge instructions discussed with pt and sister.  Pt meets discharge criteria.  Pt and sister agree.    1520  PIV dc'd    1525  Pt discharged home in  with sister

## 2019-09-05 NOTE — PROGRESS NOTES
[facility] Hospitalist Progress Note    Date of Service: 6/10/2018    Chief Complaint  73 y.o. male with a history of obesity admitted 2018 with grandma will follow found to have right ulnar and radius fracture, now status post surgical repair    Interval Problem Update  6/10: Short of breath with ambulation, satting 88% therefore will see the night. Pain is controlled.    Consultants/Specialty  Dr. De Jesus, orthopedic surgery    Disposition  Follow-up O2 sats in a.m., chest x-ray so far was negative, and I S        Review of Systems   Constitutional: Negative for chills, fever and malaise/fatigue.   HENT: Negative for sore throat.    Respiratory: Positive for shortness of breath. Negative for cough.    Cardiovascular: Negative for chest pain and palpitations.   Gastrointestinal: Negative for abdominal pain, blood in stool, diarrhea, heartburn, nausea and vomiting.   Genitourinary: Negative for dysuria and frequency.   Musculoskeletal: Positive for myalgias. Negative for back pain.   Neurological: Negative for dizziness, focal weakness, weakness and headaches.   Psychiatric/Behavioral: Negative for depression and hallucinations.   All other systems reviewed and are negative.     Physical Exam  Laboratory/Imaging   Hemodynamics  Temp (24hrs), Av.6 °C (97.9 °F), Min:36.4 °C (97.6 °F), Max:36.8 °C (98.2 °F)   Temperature: 36.4 °C (97.6 °F)  Pulse  Av.9  Min: 70  Max: 94 Heart Rate (Monitored): 73  Blood Pressure : 152/77, NIBP: 147/74      Respiratory      Respiration: 16, Pulse Oximetry: 96 %        RUL Breath Sounds: Clear, RML Breath Sounds: Clear, RLL Breath Sounds: Diminished, LOREN Breath Sounds: Clear, LLL Breath Sounds: Diminished    Fluids    Intake/Output Summary (Last 24 hours) at 06/10/18 1754  Last data filed at 06/10/18 1700   Gross per 24 hour   Intake             2734 ml   Output              750 ml   Net             1984 ml       Nutrition  Orders Placed This Encounter   Procedures   • DIET  ORDER     Standing Status:   Standing     Number of Occurrences:   1     Order Specific Question:   Diet:     Answer:   Regular [1]     Physical Exam   Constitutional: He is oriented to person, place, and time. He appears well-developed and well-nourished. No distress.   HENT:   Head: Normocephalic.   Mouth/Throat: No oropharyngeal exudate.   Superficial abrasion on her nose   Eyes: Conjunctivae are normal. Pupils are equal, round, and reactive to light.   Neck: Normal range of motion. No tracheal deviation present.   Cardiovascular: Normal rate and regular rhythm.    No murmur heard.  Pulmonary/Chest: Effort normal. No respiratory distress. He has no wheezes. He exhibits no tenderness.   Lungs are clear.   Abdominal: Soft. He exhibits no distension. There is no tenderness. There is no guarding.   Obese   Musculoskeletal: Normal range of motion. He exhibits no edema or tenderness.   Right upper extremity in splint and sling. Range of motion of fingers is normal. Mild edema of hand. Pulses intact   Lymphadenopathy:     He has no cervical adenopathy.   Neurological: He is alert and oriented to person, place, and time. No cranial nerve deficit.   Skin: Skin is warm and dry. No rash noted.   Psychiatric: He has a normal mood and affect. His behavior is normal.   Nursing note and vitals reviewed.      Recent Labs      06/09/18   1835  06/10/18   0509   WBC  12.4*  9.9   RBC  4.95  4.69*   HEMOGLOBIN  15.4  14.3   HEMATOCRIT  45.3  42.7   MCV  91.5  91.0   MCH  31.1  30.5   MCHC  34.0  33.5*   RDW  43.4  43.5   PLATELETCT  226  215   MPV  10.4  11.1     Recent Labs      06/09/18 1835  06/10/18   0509   SODIUM  137  139   POTASSIUM  3.9  3.9   CHLORIDE  105  107   CO2  22  23   GLUCOSE  107*  104*   BUN  13  11   CREATININE  0.94  0.86   CALCIUM  9.0  9.0     Recent Labs      06/09/18 1835   APTT  24.7   INR  0.98                  Assessment/Plan     * Displaced fracture of neck of right radius, initial encounter for  closed fracture- (present on admission)   Assessment & Plan    Postoperative day 0 status post repair, pain is controlled however he is having shortness breath with ambulation  OT has been ordered, as pending  Nonweightbearing right upper extremity          Quality-Core Measures    2) Acute respiratory failure with hypoxia. Chest x-ray done on admission was negative. I suspect this is due to atelectasis due to his body habitus. I asked    3) leukocytosis, present on admission, now resolved    4) obesity, weight loss advised.   on coumadin on coumadin on coumadin on coumadin

## 2022-02-23 ENCOUNTER — APPOINTMENT (OUTPATIENT)
Dept: URGENT CARE | Facility: CLINIC | Age: 77
End: 2022-02-23
Payer: COMMERCIAL

## 2023-01-07 NOTE — OP REPORT
70-year-old male presents to the emergency department for evaluation of elevated blood pressure. Patient reports for the last 3 weeks his blood pressure readings have been all over the place between 534 systolic and 116 systolic. He reports that he did not take his home medications today because he was coming to the emergency department but otherwise is taken his home medications. He reports no fevers no chills no nausea vomiting diarrhea abdominal pain urinary symptoms leg swelling or other complaints. He does report some intermittent chest fluttering's. He states no other complaints at this time    The history is provided by the patient. Hypertension  Severity:  Moderate  Onset quality:  Gradual  Duration:  3 weeks  Timing:  Intermittent  Chronicity:  Recurrent  Notable PTA blood pressures:  569C-321T systolic  Associated symptoms: palpitations    Associated symptoms: no abdominal pain, no anxiety, no blurred vision, no chest pain, no confusion, no dizziness, no ear pain, no fever, no headaches, no hypokalemia, no loss of consciousness, no nausea, no neck pain, no shortness of breath, no syncope, not vomiting and no weakness       Review of Systems   Constitutional:  Negative for chills and fever. HENT:  Negative for ear pain, sinus pressure and sore throat. Eyes:  Negative for blurred vision, pain, discharge and redness. Respiratory:  Negative for cough, shortness of breath and wheezing. Cardiovascular:  Positive for palpitations. Negative for chest pain and syncope. Gastrointestinal:  Negative for abdominal pain, diarrhea, nausea and vomiting. Genitourinary:  Negative for dysuria and frequency. Musculoskeletal:  Negative for arthralgias, back pain and neck pain. Skin:  Negative for rash and wound. Neurological:  Negative for dizziness, loss of consciousness, weakness and headaches. Hematological:  Negative for adenopathy. Psychiatric/Behavioral:  Negative for confusion.  The DATE OF SERVICE:  08/08/2018    SURGEON:  Ryan Pryor MD    ASSISTANT:  None.    PREOPERATIVE DIAGNOSIS:  Right wrist temporary hardware (spanning bridge   plate).    POSTOPERATIVE DIAGNOSIS:  Right wrist temporary hardware (spanning bridge   plate).    PROCEDURES:  1.  Right wrist deep hardware removal.  2.  Right wrist extensor carpi radialis longus tenolysis.  3.  Right wrist extensor carpi radialis brevis tenolysis.    ANESTHESIOLOGIST:  Harsha Jernigan MD    ANESTHESIA:  General.    COMPLICATIONS:  None noted.    DRAINS:  None.    SPECIMENS:  None.    ESTIMATED BLOOD LOSS:  Minimal.    INDICATIONS:  The patient is a 73-year-old gentleman known to me for now being   approximately 3 months status post severe comminuted intraarticular distal   radius fracture with severe shortening, treated with the above-mentioned   internal fixation.  This hardware is meant to be temporary.  His fracture has   healed; therefore, recommended the above-mentioned procedure.  The plate is   placed in the second compartment; therefore, recommended tenolysis of these   tendons as well.  Prior to procedure, he understood the risks, benefits and   alternatives to surgery.  He understood the risks to include, but not limited   to the risk of infection requiring repeat surgery, bleeding requiring blood   transfusion, nerve, blood vessel, tendon injury requiring repair, chronic   pain, the risks related to his initial injury, posttraumatic arthritis, wrist   stiffness, chronic pain, chronic regional pain syndrome, DVT, pulmonary   embolism, heart attack, stroke, even death.  Patient states despite these   risks, he wished to proceed with surgery.    DESCRIPTION OF PROCEDURE:  Patient was met in the preoperative holding area.    His right wrist was initialed as correct operative site.  He had an   opportunity to ask questions, all questions were answered and informed consent   was obtained.  Patient brought to the operating  patient is not nervous/anxious. All other systems reviewed and are negative. Physical Exam  Constitutional:       Appearance: Normal appearance. HENT:      Head: Normocephalic and atraumatic. Nose: Nose normal.      Mouth/Throat:      Mouth: Mucous membranes are moist.   Eyes:      Comments: Cataract and right   Cardiovascular:      Rate and Rhythm: Normal rate and regular rhythm. Pulses: Normal pulses. Heart sounds: Normal heart sounds. Pulmonary:      Effort: Pulmonary effort is normal.      Breath sounds: Normal breath sounds. Abdominal:      General: Abdomen is flat. Bowel sounds are normal. There is no distension. Palpations: Abdomen is soft. Tenderness: There is no abdominal tenderness. There is no guarding. Musculoskeletal:         General: Normal range of motion. Cervical back: Normal range of motion and neck supple. Skin:     General: Skin is warm. Capillary Refill: Capillary refill takes less than 2 seconds. Neurological:      General: No focal deficit present. Mental Status: He is alert and oriented to person, place, and time. Procedures     [facility]       ED Course as of 01/07/23 1950   Sat Jan 07, 2023   9308 Patient seen and examined. Patient reports his blood pressure readings have been high over the last 2 weeks since moving here. He reports no headache or vision changes but does report some intermittent chest flutterings at night he reports no belly pain nausea vomiting diarrhea hematuria leg swelling. Reports no confusion. She reports not taking his home blood pressure medications today. I have instructed the patient to get his blood pressure medications out of his large bag of medications to tell me which blood pressure medications he is supposed to be taking. [CF]   8447 Patient appears to be alone by himself at home he is blind in both eyes with a cataract in the right eye.   Patient has a bag full of food that appears to be room and laid supine on the   operating table.  All bony and dependent prominences were well padded.    General anesthesia was induced without complication.  Ancef was administered   for infection prophylaxis.  The right upper extremity was prepped and draped   in the usual sterile fashion.  A formal time-out was performed, which all   parties agreed upon the correct patient, procedure, and operative site.  I   began the procedure by using Esmarch to exsanguinate the extremity and   inflated the tourniquet to 250 mmHg.  I then incised the patient's distal and   proximal aspects of the incision, dissected down to subcutaneous tissues,   identified and protected the dorsal sensory nerves.  I dissected down to the   plate and the index metacarpal, removed all screws and dissected on the radius   proximally, both identified and protected the dorsal sensory nerves.  I   removed all screws and removed the plate.  At the level of the distal second   compartment, the extensor carpi radialis longus and extensor carpi radialis   brevis tendons were adherent to each other and to the bone, with tenotomy   scissors I performed a tenolysis of these tendons.  I then deflated the   tourniquet, used Bovie cautery to achieve hemostasis, copiously irrigated the   wounds with normal saline and infiltrated subcutaneous tissues with a total of   30 mL of 0.5% Marcaine with epinephrine and closed the incisions with 2-0   Monocryl suture and skin stapler.  All covered with sterile Xeroform, 4x4s,   and a soft bandage.  Patient awoke from anesthesia without noted complication   and was transferred in stable condition to PACU where he had no immediate   postoperative complaints.    POSTOPERATIVE PLAN:  The patient will be discharged home per same day   criteria, sedentary use of the upper extremity and follow up in clinic in   10-14 days for staple removal and wound check.       ____________________________________     Ryan Pryor,  . I have concerns of patient's wellbeing at home and patient will likely need admitted for placement. [CF]   190 Troponin, High Sensitivity(!): 45  Delta troponin ordered. [CF]    Creatinine(!): 1.3  Slightly above patient's baseline [CF]    BP(!): 170/85 [CF]    BP(!): 179/68 [CF]    Case discussed with Dr. Isaak Ac of hospitalist service who is returning the patient largely for social reasons but patient has no good home care he is largely blind, has no way to determine which medications he is to be taking, and I fear for patient's safety as he if he is discharged home. [CF]    EKG: This EKG is signed and interpreted by the EP. Time: 19:25  Rate: 52  Rhythm: Sinus  Interpretation: 2nd degree AV block-mobitz type I  Comparison: was a 1st degree AV block previously   [CF]    Spoke with Dr. Austin Victoria who will admit [CF]      ED Course User Index  [CF] nAgela Murphy, DO     --------------------------------------------- PAST HISTORY ---------------------------------------------  Past Medical History:  has a past medical history of Blind right eye, CAD (coronary artery disease), CHF (congestive heart failure) ([address] Chronic respiratory failure ([address] CKD (chronic kidney disease) stage 3, GFR 30-59 ml/min ([address] Diabetes mellitus ([address] Diabetes mellitus type 2, controlled ([address] HLD (hyperlipidemia), Hypertension, and TIA (transient ischemic attack). Past Surgical History:  has a past surgical history that includes Coronary artery bypass graft (); Eye surgery; ECHO Compl W Dop Color Flow (2013); ECHO Compl W Dop Color Flow (2013); Cardiac surgery; Insert Picc Cath, 5/> Yrs (2021); and laparoscopy (N/A, 2021). Social History:  reports that he quit smoking about 40 years ago. His smoking use included cigarettes. He smoked an average of 1.5 packs per day. He quit smokeless tobacco use about 38 years ago.  He reports that he does not drink alcohol and does not use MD DAVIS / MAYELA    DD:  08/08/2018 13:39:28  DT:  08/08/2018 13:50:23    D#:  [id number]  Job#:  [id number]   drugs. Family History: family history is not on file. The patients home medications have been reviewed.     Allergies: Dorzolamide, Seasonal, and Spironolactone    -------------------------------------------------- RESULTS -------------------------------------------------    LABS:  Results for orders placed or performed during the hospital encounter of 01/07/23   CBC with Auto Differential   Result Value Ref Range    WBC 5.9 4.5 - 11.5 E9/L    RBC 3.64 (L) 3.80 - 5.80 E12/L    Hemoglobin 11.8 (L) 12.5 - 16.5 g/dL    Hematocrit 34.7 (L) 37.0 - 54.0 %    MCV 95.3 80.0 - 99.9 fL    MCH 32.4 26.0 - 35.0 pg    MCHC 34.0 32.0 - 34.5 %    RDW 13.3 11.5 - 15.0 fL    Platelets 328 015 - 393 E9/L    MPV 10.1 7.0 - 12.0 fL    Neutrophils % 60.9 43.0 - 80.0 %    Immature Granulocytes % 0.0 0.0 - 5.0 %    Lymphocytes % 26.7 20.0 - 42.0 %    Monocytes % 11.2 2.0 - 12.0 %    Eosinophils % 1.0 0.0 - 6.0 %    Basophils % 0.2 0.0 - 2.0 %    Neutrophils Absolute 3.57 1.80 - 7.30 E9/L    Immature Granulocytes # 0.00 E9/L    Lymphocytes Absolute 1.57 1.50 - 4.00 E9/L    Monocytes Absolute 0.66 0.10 - 0.95 E9/L    Eosinophils Absolute 0.06 0.05 - 0.50 E9/L    Basophils Absolute 0.01 0.00 - 0.20 I0/O   Basic Metabolic Panel   Result Value Ref Range    Sodium 138 132 - 146 mmol/L    Potassium 3.9 3.5 - 5.0 mmol/L    Chloride 103 98 - 107 mmol/L    CO2 29 22 - 29 mmol/L    Anion Gap 6 (L) 7 - 16 mmol/L    Glucose 200 (H) 74 - 99 mg/dL    BUN 27 (H) 6 - 23 mg/dL    Creatinine 1.3 (H) 0.7 - 1.2 mg/dL    Est, Glom Filt Rate 55 >=60 mL/min/1.73    Calcium 8.9 8.6 - 10.2 mg/dL   Troponin   Result Value Ref Range    Troponin, High Sensitivity 45 (H) 0 - 11 ng/L   Protime-INR   Result Value Ref Range    Protime 13.3 (H) 9.3 - 12.4 sec    INR 1.2    EKG 12 Lead   Result Value Ref Range    Ventricular Rate 53 BPM    Atrial Rate 68 BPM    QRS Duration 186 ms    Q-T Interval 532 ms    QTc Calculation (Bazett) 499 ms    R Axis -43 degrees    T Axis 38 degrees       RADIOLOGY:  XR CHEST (2 VW)    Result Date: 12/11/2022  EXAMINATION: TWO XRAY VIEWS OF THE CHEST 12/11/2022 2:03 pm COMPARISON: None. HISTORY: ORDERING SYSTEM PROVIDED HISTORY: short of breath TECHNOLOGIST PROVIDED HISTORY: Reason for exam:->short of breath FINDINGS: The heart is enlarged. Postop sternotomy changes are noted There are no findings of failure. There is no pulmonary infiltrate. There is no pleural effusion. 1. Cardiomegaly. There are no findings of failure or pneumonia. CT HEAD WO CONTRAST    Result Date: 12/11/2022  EXAMINATION: CT OF THE HEAD WITHOUT CONTRAST  12/11/2022 1:55 pm TECHNIQUE: CT of the head was performed without the administration of intravenous contrast. Automated exposure control, iterative reconstruction, and/or weight based adjustment of the mA/kV was utilized to reduce the radiation dose to as low as reasonably achievable. COMPARISON: None. HISTORY: ORDERING SYSTEM PROVIDED HISTORY: blurry vision, elevated bp TECHNOLOGIST PROVIDED HISTORY: Has a \"code stroke\" or \"stroke alert\" been called? ->No Reason for exam:->blurry vision, elevated bp Decision Support Exception - unselect if not a suspected or confirmed emergency medical condition->Emergency Medical Condition (MA) FINDINGS: BRAIN/VENTRICLES: There is no acute intracranial hemorrhage, mass effect or midline shift. No abnormal extra-axial fluid collection. The gray-white differentiation is maintained without evidence of an acute infarct. There is no evidence of hydrocephalus. Diffuse volume loss. ORBITS: The visualized portion of the orbits demonstrate no acute abnormality. SINUSES: The visualized paranasal sinuses and mastoid air cells demonstrate no acute abnormality. SOFT TISSUES/SKULL:  No acute abnormality of the visualized skull or soft tissues.      No acute intracranial abnormality.       ------------------------- NURSING NOTES AND VITALS REVIEWED ---------------------------  Date / Time Roomed:  1/7/2023  5:28 PM  ED Bed Assignment:  24/24    The nursing notes within the ED encounter and vital signs as below have been reviewed. Patient Vitals for the past 24 hrs:   BP Temp Temp src Pulse Resp SpO2 Height Weight   01/07/23 1945 (!) 176/68 97.3 °F (36.3 °C) Oral 56 16 98 % -- --   01/07/23 1845 -- -- -- 77 18 -- -- --   01/07/23 1830 -- -- -- 71 19 -- -- --   01/07/23 1828 (!) 179/68 97.3 °F (36.3 °C) Oral 75 18 98 % -- --   01/07/23 1758 (!) 170/85 -- -- -- -- -- -- --   01/07/23 1725 (!) 203/69 97.3 °F (36.3 °C) Temporal 75 16 97 % 5' 10.5\" (1.791 m) 190 lb (86.2 kg)       Oxygen Saturation Interpretation: Normal    ------------------------------------------ PROGRESS NOTES ------------------------------------------  Counseling:  I have spoken with the patient and discussed todays results, in addition to providing specific details for the plan of care and counseling regarding the diagnosis and prognosis. Their questions are answered at this time and they are agreeable with the plan of admission.    --------------------------------- ADDITIONAL PROVIDER NOTES ---------------------------------  This patient's ED course included: a personal history and physicial examination, re-evaluation prior to disposition, multiple bedside re-evaluations, and IV medications    This patient has remained hemodynamically stable during their ED course. Diagnosis:  1. Essential hypertension    2. Impaired mobility and ADLs    3. Cataract of right eye, unspecified cataract type    4. AV block, Mobitz 1        Disposition:  Patient's disposition: Admit to med/surg floor  Patient's condition is fair.            Marry Horne, DO  01/07/23 149 Boogie Hobson, DO  01/07/23 1950

## (undated) DEVICE — NEPTUNE 4 PORT MANIFOLD - (20/PK)

## (undated) DEVICE — BANDAGE ELASTIC 4 HONEYCOMB - 4"X5YD LF (20/CA)"

## (undated) DEVICE — TOWELS CLOTH SURGICAL - (4/PK 20PK/CA)

## (undated) DEVICE — GLOVE BIOGEL PI ORTHO SZ 9 PF LF

## (undated) DEVICE — TUBING CLEARLINK DUO-VENT - C-FLO (48EA/CA)

## (undated) DEVICE — DRAPE STRLE REG TOWEL 18X24 - (10/BX 4BX/CA)"

## (undated) DEVICE — BLOCK

## (undated) DEVICE — PADDING CAST 4 IN STERILE - 4 X 4 YDS (24/CA)

## (undated) DEVICE — HUMID-VENT HEAT AND MOISTURE EXCHANGE- (50/BX)

## (undated) DEVICE — ELECTRODE 850 FOAM ADHESIVE - HYDROGEL RADIOTRNSPRNT (50/PK)

## (undated) DEVICE — DRAPE LARGE 3 QUARTER - (20/CA)

## (undated) DEVICE — GLOVE BIOGEL SZ 6.5 SURGICAL PF LTX (50PR/BX 4BX/CA)

## (undated) DEVICE — MASK, LARYNGEAL AIRWAY #4

## (undated) DEVICE — HEAD HOLDER JUNIOR/ADULT

## (undated) DEVICE — PAD PREP 24 X 48 CUFFED - (100/CA)

## (undated) DEVICE — CHLORAPREP 26 ML APPLICATOR - ORANGE TINT(25/CA)

## (undated) DEVICE — SODIUM CHL IRRIGATION 0.9% 1000ML (12EA/CA)

## (undated) DEVICE — SUTURE 4-0 ETHILON PS-2 18 BLACK (36PK/BX)

## (undated) DEVICE — SLEEVE, VASO, THIGH, MED

## (undated) DEVICE — BLADE SURGICAL CLIPPER - (50EA/CA)

## (undated) DEVICE — DRAPE C-ARM LARGE 41IN X 74 IN - (10/BX 2BX/CA)

## (undated) DEVICE — GLOVE BIOGEL SZ 8 SURGICAL PF LTX - (50PR/BX 4BX/CA)

## (undated) DEVICE — GLOVE, LITE (PAIR)

## (undated) DEVICE — SUTURE GENERAL

## (undated) DEVICE — BLADE SURGICAL #15 - (50/BX 3BX/CA)

## (undated) DEVICE — SET EXTENSION WITH 2 PORTS (48EA/CA) ***PART #2C8610 IS A SUBSTITUTE*****

## (undated) DEVICE — SUCTION INSTRUMENT YANKAUER BULBOUS TIP W/O VENT (50EA/CA)

## (undated) DEVICE — CANISTER SUCTION RIGID RED 1500CC (40EA/CA)

## (undated) DEVICE — DRAPE IOBAN II INCISE 23X17 - (10EA/BX 4BX/CA)

## (undated) DEVICE — KIT  I.V. START (100EA/CA)

## (undated) DEVICE — SYRINGE 10 ML CONTROL LL (25EA/BX 4BX/CA)

## (undated) DEVICE — CANISTER SUCTION 3000ML MECHANICAL FILTER AUTO SHUTOFF MEDI-VAC NONSTERILE LF DISP  (40EA/CA)

## (undated) DEVICE — TOURNIQUET, STERILE 18 (RED)

## (undated) DEVICE — SUTURE 2-0 MONOCRYL PLUS UNDYED CT-1 1 X 36 (36EA/BX)"

## (undated) DEVICE — ELECTRODE DUAL RETURN W/ CORD - (50/PK)

## (undated) DEVICE — WRAP COBAN SELF-ADHERENT 6 IN X  5YDS STERILE TAN (12/CA)

## (undated) DEVICE — KIT ANESTHESIA W/CIRCUIT & 3/LT BAG W/FILTER (20EA/CA)

## (undated) DEVICE — PACK LOWER EXTREMITY - (2/CA)

## (undated) DEVICE — KIT ROOM DECONTAMINATION

## (undated) DEVICE — STAPLER SKIN DISP - (6/BX 10BX/CA) VISISTAT

## (undated) DEVICE — PROTECTOR ULNA NERVE - (36PR/CA)

## (undated) DEVICE — COVER LIGHT HANDLE FLEXIBLE - SOFT (2EA/PK 80PK/CA)

## (undated) DEVICE — MASK ANESTHESIA ADULT  - (100/CA)

## (undated) DEVICE — SENSOR SPO2 NEO LNCS ADHESIVE (20/BX) SEE USER NOTES

## (undated) DEVICE — CATHETER IV 20 GA X 1-1/4 ---SURG.& SDS ONLY--- (50EA/BX)

## (undated) DEVICE — GLOVE BIOGEL ECLIPSE PF LATEX SIZE 8.5 (50PR/BX)

## (undated) DEVICE — STOCKINET TUBULAR 4IN STERILE - 4 X 36YDS (25/CA)

## (undated) DEVICE — LACTATED RINGERS INJ 1000 ML - (14EA/CA 60CA/PF)

## (undated) DEVICE — DRILL

## (undated) DEVICE — COTTON ROLL 1 POUND BIOSEAL - (5RL/CA)

## (undated) DEVICE — SPONGE GAUZE STER 4X4 8-PL - (2/PK 50PK/BX 12BX/CS)

## (undated) DEVICE — PLASTER ROLL 4X5YD XTRA FAST - 2-4 MIN (12EA/BX 6BX/CA)"

## (undated) DEVICE — DRESSING XEROFORM 1X8 - (50/BX 4BX/CA)

## (undated) DEVICE — SET LEADWIRE 5 LEAD BEDSIDE DISPOSABLE ECG (1SET OF 5/EA)

## (undated) DEVICE — PADDING CAST 4 IN X 4 YDS - SOF-ROLL (12RL/BG 6BG/CT)

## (undated) DEVICE — GLOVE SZ 7.5 LF PROTEXIS (50PR/BX)

## (undated) DEVICE — GLOVE BIOGEL INDICATOR SZ 6.5 SURGICAL PF LTX - (50PR/BX 4BX/CA)